# Patient Record
Sex: FEMALE | Race: BLACK OR AFRICAN AMERICAN | Employment: FULL TIME | URBAN - METROPOLITAN AREA
[De-identification: names, ages, dates, MRNs, and addresses within clinical notes are randomized per-mention and may not be internally consistent; named-entity substitution may affect disease eponyms.]

---

## 2017-02-16 ENCOUNTER — PATIENT MESSAGE (OUTPATIENT)
Dept: INTERNAL MEDICINE CLINIC | Facility: CLINIC | Age: 52
End: 2017-02-16

## 2017-02-16 NOTE — TELEPHONE ENCOUNTER
From: Danish Flores  To: Dipika Wilson MD  Sent: 2/16/2017 5:43 AM CST  Subject: Other    Good Morning  I had a Gout flare up last Wednesday that was tolerable when it came to taking meds however I was not able to walk or drive because it was in my righ

## 2017-02-16 NOTE — TELEPHONE ENCOUNTER
Called patient to offer brianne tomorrow - patient already scheduled brianne. With DR. MASSEY for 9 am 2/17

## 2017-02-17 ENCOUNTER — OFFICE VISIT (OUTPATIENT)
Dept: INTERNAL MEDICINE CLINIC | Facility: CLINIC | Age: 52
End: 2017-02-17

## 2017-02-17 ENCOUNTER — LAB ENCOUNTER (OUTPATIENT)
Dept: LAB | Age: 52
End: 2017-02-17
Attending: INTERNAL MEDICINE
Payer: COMMERCIAL

## 2017-02-17 VITALS
HEART RATE: 69 BPM | SYSTOLIC BLOOD PRESSURE: 172 MMHG | OXYGEN SATURATION: 98 % | TEMPERATURE: 98 F | WEIGHT: 255 LBS | DIASTOLIC BLOOD PRESSURE: 110 MMHG | BODY MASS INDEX: 46.93 KG/M2 | HEIGHT: 62 IN

## 2017-02-17 DIAGNOSIS — M1A.9XX0 CHRONIC GOUT WITHOUT TOPHUS, UNSPECIFIED CAUSE, UNSPECIFIED SITE: Primary | ICD-10-CM

## 2017-02-17 DIAGNOSIS — I10 ESSENTIAL HYPERTENSION: ICD-10-CM

## 2017-02-17 DIAGNOSIS — Z12.39 BREAST CANCER SCREENING: ICD-10-CM

## 2017-02-17 DIAGNOSIS — M1A.9XX0 CHRONIC GOUT WITHOUT TOPHUS, UNSPECIFIED CAUSE, UNSPECIFIED SITE: ICD-10-CM

## 2017-02-17 LAB
ALBUMIN SERPL BCP-MCNC: 3.6 G/DL (ref 3.5–4.8)
ALBUMIN/GLOB SERPL: 0.9 {RATIO} (ref 1–2)
ALP SERPL-CCNC: 74 U/L (ref 32–100)
ALT SERPL-CCNC: 22 U/L (ref 14–54)
ANION GAP SERPL CALC-SCNC: 9 MMOL/L (ref 0–18)
AST SERPL-CCNC: 19 U/L (ref 15–41)
BASOPHILS # BLD: 0 K/UL (ref 0–0.2)
BASOPHILS NFR BLD: 1 %
BILIRUB SERPL-MCNC: 0.6 MG/DL (ref 0.3–1.2)
BILIRUB UR QL: NEGATIVE
BUN SERPL-MCNC: 7 MG/DL (ref 8–20)
BUN/CREAT SERPL: 8.3 (ref 10–20)
CALCIUM SERPL-MCNC: 9.4 MG/DL (ref 8.5–10.5)
CHLORIDE SERPL-SCNC: 105 MMOL/L (ref 95–110)
CHOLEST SERPL-MCNC: 202 MG/DL (ref 110–200)
CLARITY UR: CLEAR
CO2 SERPL-SCNC: 29 MMOL/L (ref 22–32)
COLOR UR: YELLOW
CREAT SERPL-MCNC: 0.84 MG/DL (ref 0.5–1.5)
EOSINOPHIL # BLD: 0.1 K/UL (ref 0–0.7)
EOSINOPHIL NFR BLD: 1 %
ERYTHROCYTE [DISTWIDTH] IN BLOOD BY AUTOMATED COUNT: 15.4 % (ref 11–15)
GLOBULIN PLAS-MCNC: 3.9 G/DL (ref 2.5–3.7)
GLUCOSE SERPL-MCNC: 101 MG/DL (ref 70–99)
GLUCOSE UR-MCNC: NEGATIVE MG/DL
HBA1C MFR BLD: 6.5 % (ref 4–6)
HCT VFR BLD AUTO: 41.8 % (ref 35–48)
HDLC SERPL-MCNC: 39 MG/DL
HGB BLD-MCNC: 13.5 G/DL (ref 12–16)
KETONES UR-MCNC: NEGATIVE MG/DL
LDLC SERPL CALC-MCNC: 142 MG/DL (ref 0–99)
LYMPHOCYTES # BLD: 2 K/UL (ref 1–4)
LYMPHOCYTES NFR BLD: 33 %
MCH RBC QN AUTO: 25.9 PG (ref 27–32)
MCHC RBC AUTO-ENTMCNC: 32.3 G/DL (ref 32–37)
MCV RBC AUTO: 80.1 FL (ref 80–100)
MONOCYTES # BLD: 0.5 K/UL (ref 0–1)
MONOCYTES NFR BLD: 9 %
NEUTROPHILS # BLD AUTO: 3.5 K/UL (ref 1.8–7.7)
NEUTROPHILS NFR BLD: 57 %
NITRITE UR QL STRIP.AUTO: NEGATIVE
NONHDLC SERPL-MCNC: 163 MG/DL
OSMOLALITY UR CALC.SUM OF ELEC: 294 MOSM/KG (ref 275–295)
PH UR: 6 [PH] (ref 5–8)
PLATELET # BLD AUTO: 225 K/UL (ref 140–400)
PMV BLD AUTO: 9.6 FL (ref 7.4–10.3)
POTASSIUM SERPL-SCNC: 4.2 MMOL/L (ref 3.3–5.1)
PROT SERPL-MCNC: 7.5 G/DL (ref 5.9–8.4)
PROT UR-MCNC: NEGATIVE MG/DL
RBC # BLD AUTO: 5.21 M/UL (ref 3.7–5.4)
RBC #/AREA URNS AUTO: 1 /HPF
SODIUM SERPL-SCNC: 143 MMOL/L (ref 136–144)
SP GR UR STRIP: 1.01 (ref 1–1.03)
TRIGL SERPL-MCNC: 103 MG/DL (ref 1–149)
TSH SERPL-ACNC: 1.37 UIU/ML (ref 0.34–5.6)
URATE SERPL-MCNC: 7.8 MG/DL (ref 2.1–7.4)
UROBILINOGEN UR STRIP-ACNC: <2
VIT C UR-MCNC: NEGATIVE MG/DL
WBC # BLD AUTO: 6.1 K/UL (ref 4–11)
WBC #/AREA URNS AUTO: 1 /HPF

## 2017-02-17 PROCEDURE — 80053 COMPREHEN METABOLIC PANEL: CPT

## 2017-02-17 PROCEDURE — 99214 OFFICE O/P EST MOD 30 MIN: CPT | Performed by: INTERNAL MEDICINE

## 2017-02-17 PROCEDURE — 99212 OFFICE O/P EST SF 10 MIN: CPT | Performed by: INTERNAL MEDICINE

## 2017-02-17 PROCEDURE — 83036 HEMOGLOBIN GLYCOSYLATED A1C: CPT

## 2017-02-17 PROCEDURE — 85025 COMPLETE CBC W/AUTO DIFF WBC: CPT

## 2017-02-17 PROCEDURE — 84443 ASSAY THYROID STIM HORMONE: CPT

## 2017-02-17 PROCEDURE — 84550 ASSAY OF BLOOD/URIC ACID: CPT

## 2017-02-17 PROCEDURE — 81001 URINALYSIS AUTO W/SCOPE: CPT

## 2017-02-17 PROCEDURE — 36415 COLL VENOUS BLD VENIPUNCTURE: CPT

## 2017-02-17 PROCEDURE — 80061 LIPID PANEL: CPT

## 2017-02-17 RX ORDER — VERAPAMIL HYDROCHLORIDE 180 MG/1
180 CAPSULE, EXTENDED RELEASE ORAL DAILY
Qty: 90 CAPSULE | Refills: 3 | Status: SHIPPED | OUTPATIENT
Start: 2017-02-17 | End: 2017-06-20

## 2017-02-17 RX ORDER — METHYLPREDNISOLONE 4 MG/1
TABLET ORAL
Qty: 1 KIT | Refills: 6 | Status: SHIPPED | OUTPATIENT
Start: 2017-02-17 | End: 2017-03-13

## 2017-02-17 RX ORDER — ENALAPRIL MALEATE 10 MG/1
10 TABLET ORAL DAILY
Qty: 90 TABLET | Refills: 3 | Status: SHIPPED | OUTPATIENT
Start: 2017-02-17 | End: 2017-06-20

## 2017-02-17 RX ORDER — HYDROCODONE BITARTRATE AND ACETAMINOPHEN 5; 325 MG/1; MG/1
1 TABLET ORAL EVERY 8 HOURS PRN
Qty: 30 TABLET | Refills: 0 | Status: SHIPPED | OUTPATIENT
Start: 2017-02-17 | End: 2017-07-05

## 2017-02-17 RX ORDER — ALLOPURINOL 300 MG/1
300 TABLET ORAL DAILY
Qty: 90 TABLET | Refills: 3 | Status: SHIPPED | OUTPATIENT
Start: 2017-02-17 | End: 2018-03-19

## 2017-02-17 NOTE — PROGRESS NOTES
Janneth Velazquez is a 54year old female. HPI:   She is felling well generally but she continues to have repeated attacks of gout  - most likely related to non-compliane with colchicine but she states this makes her dizzy. She is not taking allopurinol.  She kg)  BMI 46.63 kg/m2  SpO2 98%  GENERAL: well developed, well nourished,in no apparent distress  SKIN: no rashes,no suspicious lesions  HEENT: atraumatic, normocephalic,ears and throat are clear  NECK: supple,no adenopathy,no bruits  LUNGS: clear to auscul

## 2017-02-18 ENCOUNTER — HOSPITAL ENCOUNTER (OUTPATIENT)
Dept: GENERAL RADIOLOGY | Facility: HOSPITAL | Age: 52
Discharge: HOME | End: 2017-02-18
Attending: INTERNAL MEDICINE
Payer: COMMERCIAL

## 2017-02-18 DIAGNOSIS — M1A.9XX0 CHRONIC GOUT WITHOUT TOPHUS, UNSPECIFIED CAUSE, UNSPECIFIED SITE: ICD-10-CM

## 2017-02-18 DIAGNOSIS — I10 ESSENTIAL HYPERTENSION: ICD-10-CM

## 2017-02-18 DIAGNOSIS — Z12.39 BREAST CANCER SCREENING: ICD-10-CM

## 2017-02-18 PROCEDURE — 73610 X-RAY EXAM OF ANKLE: CPT

## 2017-02-23 ENCOUNTER — PATIENT MESSAGE (OUTPATIENT)
Dept: INTERNAL MEDICINE CLINIC | Facility: CLINIC | Age: 52
End: 2017-02-23

## 2017-02-24 ENCOUNTER — TELEPHONE (OUTPATIENT)
Dept: INTERNAL MEDICINE CLINIC | Facility: CLINIC | Age: 52
End: 2017-02-24

## 2017-02-24 DIAGNOSIS — M10.00 IDIOPATHIC GOUT, UNSPECIFIED CHRONICITY, UNSPECIFIED SITE: Primary | ICD-10-CM

## 2017-02-24 NOTE — TELEPHONE ENCOUNTER
Called patient with Dr Vale Galaviz message. She was not taking the allopurinol daily. She will resume and repeat uric acid level in 2 months. I will enter lab order.

## 2017-02-24 NOTE — TELEPHONE ENCOUNTER
From: Haleigh Flores  To: Andrea Maldonado MD  Sent: 2/23/2017 1:32 PM CST  Subject: Other    I don't have a copy of it  Again it was faxed from [facility]    on the system I am not able to reply to previous notes

## 2017-02-24 NOTE — TELEPHONE ENCOUNTER
labs good including lipid;  A1C 6.5 which indicates mild diabetes    Her uric acid is at high range of normal and will lead to repeated episodes of gout. Is she taking allopurinol 300 mg daily? ?  Let me know.  If not resume taking  And repeat uric acid in

## 2017-03-10 ENCOUNTER — HOSPITAL ENCOUNTER (OUTPATIENT)
Dept: MAMMOGRAPHY | Facility: HOSPITAL | Age: 52
Discharge: HOME | End: 2017-03-10
Attending: INTERNAL MEDICINE
Payer: COMMERCIAL

## 2017-03-10 DIAGNOSIS — Z12.39 BREAST CANCER SCREENING: ICD-10-CM

## 2017-03-10 PROCEDURE — 77067 SCR MAMMO BI INCL CAD: CPT

## 2017-03-13 ENCOUNTER — OFFICE VISIT (OUTPATIENT)
Dept: RHEUMATOLOGY | Facility: CLINIC | Age: 52
End: 2017-03-13

## 2017-03-13 VITALS
HEIGHT: 62 IN | HEART RATE: 70 BPM | BODY MASS INDEX: 47.29 KG/M2 | DIASTOLIC BLOOD PRESSURE: 110 MMHG | WEIGHT: 257 LBS | SYSTOLIC BLOOD PRESSURE: 168 MMHG

## 2017-03-13 DIAGNOSIS — M1A.9XX0 CHRONIC GOUT WITHOUT TOPHUS, UNSPECIFIED CAUSE, UNSPECIFIED SITE: Primary | ICD-10-CM

## 2017-03-13 PROCEDURE — 99212 OFFICE O/P EST SF 10 MIN: CPT | Performed by: INTERNAL MEDICINE

## 2017-03-13 PROCEDURE — 99244 OFF/OP CNSLTJ NEW/EST MOD 40: CPT | Performed by: INTERNAL MEDICINE

## 2017-03-13 NOTE — PROGRESS NOTES
Dear Maxwell Warren:    I saw your patient Rut Alcantar in consultation this morning at your request for evaluation of gout. As you know, she is a delightful 46-year-old woman who had her first attack in a knee in 2002.   Fluid was aspirated in an emergency room, an exercising. Review of systems:  No fevers or chills. She has hot flashes. No anorexia. She is purposely lost weight. No rash. Some stable hair thinning for 20 years. No history of internal eye inflammation, oral or nasal ulcers, lymphadenopathy. Chio DALAL   Rheumatology.

## 2017-06-12 ENCOUNTER — TELEPHONE (OUTPATIENT)
Dept: INTERNAL MEDICINE CLINIC | Facility: CLINIC | Age: 52
End: 2017-06-12

## 2017-06-12 ENCOUNTER — PATIENT MESSAGE (OUTPATIENT)
Dept: INTERNAL MEDICINE CLINIC | Facility: CLINIC | Age: 52
End: 2017-06-12

## 2017-06-12 DIAGNOSIS — Z00.00 ANNUAL PHYSICAL EXAM: Primary | ICD-10-CM

## 2017-06-12 NOTE — TELEPHONE ENCOUNTER
From: Araceli Flores  To: Kim Azul MD  Sent: 6/12/2017 12:05 PM CDT  Subject: Other    Hi  I have not been feeling well. I would like to go and get some bloodwork that Dr Rondell Ganser wanted me to get a few months back.   I would love to have that done this

## 2017-06-12 NOTE — TELEPHONE ENCOUNTER
Please order CBC, CMP, lipids, TSH, uric acid, A1C, urine microalbumin, u/a c micro    See me next week

## 2017-06-15 ENCOUNTER — HOSPITAL ENCOUNTER (EMERGENCY)
Facility: HOSPITAL | Age: 52
Discharge: HOME | End: 2017-06-16
Attending: EMERGENCY MEDICINE

## 2017-06-15 DIAGNOSIS — N39.0 URINARY TRACT INFECTION WITHOUT HEMATURIA, SITE UNSPECIFIED: Primary | ICD-10-CM

## 2017-06-15 PROCEDURE — 87086 URINE CULTURE/COLONY COUNT: CPT

## 2017-06-15 PROCEDURE — 36415 COLL VENOUS BLD VENIPUNCTURE: CPT

## 2017-06-15 PROCEDURE — 99285 EMERGENCY DEPT VISIT HI MDM: CPT

## 2017-06-15 PROCEDURE — 81001 URINALYSIS AUTO W/SCOPE: CPT

## 2017-06-15 PROCEDURE — 96365 THER/PROPH/DIAG IV INF INIT: CPT

## 2017-06-15 RX ORDER — IBUPROFEN 600 MG/1
600 TABLET ORAL ONCE
Status: DISCONTINUED | OUTPATIENT
Start: 2017-06-15 | End: 2017-06-15

## 2017-06-16 ENCOUNTER — TELEPHONE (OUTPATIENT)
Dept: INTERNAL MEDICINE CLINIC | Facility: CLINIC | Age: 52
End: 2017-06-16

## 2017-06-16 ENCOUNTER — APPOINTMENT (OUTPATIENT)
Dept: CT IMAGING | Facility: HOSPITAL | Age: 52
End: 2017-06-16
Attending: EMERGENCY MEDICINE

## 2017-06-16 VITALS
HEART RATE: 66 BPM | RESPIRATION RATE: 18 BRPM | HEIGHT: 62 IN | SYSTOLIC BLOOD PRESSURE: 139 MMHG | OXYGEN SATURATION: 95 % | BODY MASS INDEX: 44.9 KG/M2 | TEMPERATURE: 99 F | WEIGHT: 244 LBS | DIASTOLIC BLOOD PRESSURE: 74 MMHG

## 2017-06-16 PROCEDURE — 87040 BLOOD CULTURE FOR BACTERIA: CPT | Performed by: EMERGENCY MEDICINE

## 2017-06-16 PROCEDURE — 80048 BASIC METABOLIC PNL TOTAL CA: CPT | Performed by: EMERGENCY MEDICINE

## 2017-06-16 PROCEDURE — 74176 CT ABD & PELVIS W/O CONTRAST: CPT | Performed by: EMERGENCY MEDICINE

## 2017-06-16 PROCEDURE — 85025 COMPLETE CBC W/AUTO DIFF WBC: CPT | Performed by: EMERGENCY MEDICINE

## 2017-06-16 RX ORDER — SULFAMETHOXAZOLE AND TRIMETHOPRIM 200; 40 MG/5ML; MG/5ML
20 SUSPENSION ORAL 2 TIMES DAILY
Qty: 400 ML | Refills: 0 | Status: SHIPPED | OUTPATIENT
Start: 2017-06-16 | End: 2017-06-26

## 2017-06-16 NOTE — TELEPHONE ENCOUNTER
Patient contacted, notified that not all the labs that [facility] ordered per 6/12/17 encounter were drawn in the hospital, she still needs to get fasting labs done.  Patient states she is on antibiotics, UA and urine culture already done at the hospital, pat

## 2017-06-16 NOTE — TELEPHONE ENCOUNTER
Pt. Has an appt. Tuesday Pt. Was discharged form [facility] today she wants to know if the blood they took is sufficient enough or if she should still get the labs done that Dr. Jordi Shelby ordered since she was supposed to fast for 14 hours.  Pt. Is also mateus

## 2017-06-17 ENCOUNTER — LAB ENCOUNTER (OUTPATIENT)
Dept: LAB | Facility: HOSPITAL | Age: 52
End: 2017-06-17
Attending: INTERNAL MEDICINE

## 2017-06-17 DIAGNOSIS — Z00.00 ANNUAL PHYSICAL EXAM: ICD-10-CM

## 2017-06-17 PROCEDURE — 84550 ASSAY OF BLOOD/URIC ACID: CPT

## 2017-06-17 PROCEDURE — 82570 ASSAY OF URINE CREATININE: CPT

## 2017-06-17 PROCEDURE — 80053 COMPREHEN METABOLIC PANEL: CPT

## 2017-06-17 PROCEDURE — 80061 LIPID PANEL: CPT

## 2017-06-17 PROCEDURE — 83036 HEMOGLOBIN GLYCOSYLATED A1C: CPT

## 2017-06-17 PROCEDURE — 82043 UR ALBUMIN QUANTITATIVE: CPT

## 2017-06-17 PROCEDURE — 84443 ASSAY THYROID STIM HORMONE: CPT

## 2017-06-17 PROCEDURE — 36415 COLL VENOUS BLD VENIPUNCTURE: CPT

## 2017-06-18 NOTE — ED PROVIDER NOTES
Patient Seen in: [facility] Emergency Department    History   Patient presents with:  Fever (infectious)    Stated Complaint: 103 fever no meds taken for fever weakness and HTN    HPI    46-year-old female presents to the emergency department with taken for fever weakness and HTN  Other systems are as noted in HPI. Constitutional and vital signs reviewed. All other systems reviewed and negative except as noted above.     PSFH elements reviewed from today and agreed except as otherwise stated in Bacteria Urine Few (*)     All other components within normal limits   BASIC METABOLIC PANEL (8) - Abnormal; Notable for the following:     Glucose 120 (*)     BUN/CREA Ratio 8.2 (*)     All other components within normal limits   CBC W/ DIFFERENTIAL -

## 2017-06-20 ENCOUNTER — OFFICE VISIT (OUTPATIENT)
Dept: INTERNAL MEDICINE CLINIC | Facility: CLINIC | Age: 52
End: 2017-06-20

## 2017-06-20 VITALS
DIASTOLIC BLOOD PRESSURE: 88 MMHG | BODY MASS INDEX: 45.08 KG/M2 | WEIGHT: 245 LBS | OXYGEN SATURATION: 98 % | HEIGHT: 62 IN | TEMPERATURE: 98 F | SYSTOLIC BLOOD PRESSURE: 158 MMHG | HEART RATE: 72 BPM

## 2017-06-20 DIAGNOSIS — N30.01 ACUTE CYSTITIS WITH HEMATURIA: Primary | ICD-10-CM

## 2017-06-20 DIAGNOSIS — M1A.9XX0 CHRONIC GOUT WITHOUT TOPHUS, UNSPECIFIED CAUSE, UNSPECIFIED SITE: ICD-10-CM

## 2017-06-20 DIAGNOSIS — I10 ESSENTIAL HYPERTENSION: ICD-10-CM

## 2017-06-20 PROBLEM — N39.0 UTI (URINARY TRACT INFECTION): Status: ACTIVE | Noted: 2017-06-20

## 2017-06-20 PROCEDURE — 99212 OFFICE O/P EST SF 10 MIN: CPT | Performed by: INTERNAL MEDICINE

## 2017-06-20 PROCEDURE — 99214 OFFICE O/P EST MOD 30 MIN: CPT | Performed by: INTERNAL MEDICINE

## 2017-06-20 RX ORDER — VERAPAMIL HYDROCHLORIDE 180 MG/1
180 CAPSULE, EXTENDED RELEASE ORAL DAILY
Qty: 90 CAPSULE | Refills: 3 | Status: SHIPPED | OUTPATIENT
Start: 2017-06-20 | End: 2018-03-23

## 2017-06-20 RX ORDER — ENALAPRIL MALEATE 10 MG/1
10 TABLET ORAL DAILY
Qty: 90 TABLET | Refills: 3 | Status: SHIPPED | OUTPATIENT
Start: 2017-06-20 | End: 2018-03-19

## 2017-06-20 NOTE — PROGRESS NOTES
Carlos Arenas is a 50year old female. HPI:   1. Acute cystitis with hematuria  - she developed a pulling sensation after voiding on 6/4- she went ot ED on 6/15 with fever to 103 and shills and U/A and culture neg - which was ultimately negative.  2 blood exertion  CARDIOVASCULAR: denies chest pain on exertion  GI: denies abdominal pain and denies heartburn  NEURO: denies headaches    EXAM:   /88 mmHg  Pulse 72  Temp(Src) 98.4 °F (36.9 °C) (Oral)  Ht 5' 2\" (1.575 m)  Wt 245 lb (111.131 kg)  BMI 44.80

## 2017-06-30 ENCOUNTER — TELEPHONE (OUTPATIENT)
Dept: INTERNAL MEDICINE CLINIC | Facility: CLINIC | Age: 52
End: 2017-06-30

## 2017-07-05 ENCOUNTER — TELEPHONE (OUTPATIENT)
Dept: INTERNAL MEDICINE CLINIC | Facility: CLINIC | Age: 52
End: 2017-07-05

## 2017-07-05 RX ORDER — HYDROCODONE BITARTRATE AND ACETAMINOPHEN 5; 325 MG/1; MG/1
1 TABLET ORAL EVERY 8 HOURS PRN
Qty: 30 TABLET | Refills: 0 | Status: SHIPPED | OUTPATIENT
Start: 2017-07-05 | End: 2017-07-12

## 2017-07-05 RX ORDER — METHYLPREDNISOLONE 4 MG/1
TABLET ORAL
Qty: 1 KIT | Refills: 1 | Status: SHIPPED | OUTPATIENT
Start: 2017-07-05 | End: 2018-04-27

## 2017-07-05 NOTE — TELEPHONE ENCOUNTER
Acute gout - gve Medrol and Norco    [facility] given to [address] in Medrol dosepak to whatever pharmacy she is now using and 1 refill

## 2017-07-05 NOTE — TELEPHONE ENCOUNTER
Patient called and informed Medrol and Naveen Julio was ordered for her. Patient verified pharmacy as [facility] in [place] where Medrol was sent to. Patient informed Naveen Julio script needs to be picked up at office location.  Patient verbalized understanding with no fu

## 2017-07-05 NOTE — TELEPHONE ENCOUNTER
Patient states she got 's message regarding the gout and she is not doing well, originally she was having problems with left ankle and now gout is in her right big toe (since Saturday but symptoms \"fully kicked in Monday night).  Patient wanted

## 2017-07-11 ENCOUNTER — TELEPHONE (OUTPATIENT)
Dept: INTERNAL MEDICINE CLINIC | Facility: CLINIC | Age: 52
End: 2017-07-11

## 2017-07-11 NOTE — TELEPHONE ENCOUNTER
Regarding: Other  Contact: [phone]  ----- Message from Yousuf Franco RN sent at 7/10/2017  9:08 AM CDT -----       ----- Message from [address] [place] [address] to Marie Ellison MD sent at 7/10/2017  8:55 AM -----   Hi Dr Deena Tyler  I have a new turn of ev

## 2017-07-12 ENCOUNTER — OFFICE VISIT (OUTPATIENT)
Dept: INTERNAL MEDICINE CLINIC | Facility: CLINIC | Age: 52
End: 2017-07-12

## 2017-07-12 VITALS
TEMPERATURE: 99 F | SYSTOLIC BLOOD PRESSURE: 160 MMHG | BODY MASS INDEX: 46 KG/M2 | HEART RATE: 68 BPM | DIASTOLIC BLOOD PRESSURE: 110 MMHG | WEIGHT: 251 LBS

## 2017-07-12 DIAGNOSIS — M1A.9XX0 CHRONIC GOUT WITHOUT TOPHUS, UNSPECIFIED CAUSE, UNSPECIFIED SITE: Primary | ICD-10-CM

## 2017-07-12 DIAGNOSIS — I10 ESSENTIAL HYPERTENSION: ICD-10-CM

## 2017-07-12 RX ORDER — HYDROCODONE BITARTRATE AND ACETAMINOPHEN 5; 325 MG/1; MG/1
1 TABLET ORAL EVERY 8 HOURS PRN
Qty: 30 TABLET | Refills: 0 | Status: SHIPPED | OUTPATIENT
Start: 2017-07-12

## 2017-07-13 NOTE — PROGRESS NOTES
Vandana Masters is a 50year old female. HPI:   1.  Chronic gout without tophus, unspecified cause, unspecified site - she has had severe and recurrent gout which started on 7/1 - she had pain in the left ankle and then pain in the right great toe and right Never Smoker                                                              Smokeless tobacco: Never Used                      Alcohol use:  No                 REVIEW OF SYSTEMS:   GENERAL HEALTH: feels well otherwise  SKIN: denies any unusual skin lesions or

## 2017-07-17 ENCOUNTER — PATIENT MESSAGE (OUTPATIENT)
Dept: INTERNAL MEDICINE CLINIC | Facility: CLINIC | Age: 52
End: 2017-07-17

## 2017-07-17 ENCOUNTER — TELEPHONE (OUTPATIENT)
Dept: INTERNAL MEDICINE CLINIC | Facility: CLINIC | Age: 52
End: 2017-07-17

## 2017-07-17 NOTE — TELEPHONE ENCOUNTER
Ray Edmonds   After my appointment on Wednesday, I felt a great relief from Gout on Thursday from both joints- my Right Toe and my right Ankle. I was at a pain level of 3 on a scale of 1-10. With that being said- I DID NOT START THE STEROID PACK.

## 2017-08-07 ENCOUNTER — TELEPHONE (OUTPATIENT)
Dept: INTERNAL MEDICINE CLINIC | Facility: CLINIC | Age: 52
End: 2017-08-07

## 2017-08-07 ENCOUNTER — PATIENT MESSAGE (OUTPATIENT)
Dept: INTERNAL MEDICINE CLINIC | Facility: CLINIC | Age: 52
End: 2017-08-07

## 2017-08-07 RX ORDER — METHYLPREDNISOLONE 4 MG/1
TABLET ORAL
Qty: 1 KIT | Refills: 0 | Status: SHIPPED | OUTPATIENT
Start: 2017-08-07 | End: 2018-03-19

## 2017-08-07 NOTE — TELEPHONE ENCOUNTER
From: Ruth Flores  To: Britt Wallace MD  Sent: 8/7/2017 3:36 PM CDT  Subject: Other    I called around 8am this morning and spoke to Myra Montez if I could have Dr Zahra Richter could call   I have active gout in Right big toe  Taking Allopurinol faithfully

## 2017-08-07 NOTE — TELEPHONE ENCOUNTER
Patient having a gout attack - started today - right toe. Patient was instructed to advise Dr. Shiva Kearns when this happens.

## 2017-08-07 NOTE — TELEPHONE ENCOUNTER
8/7/17 3:36 PM   I called around 8am this morning and spoke to Keerthi Reddy if I could have Dr Anup Edmonds could call   I have active gout in Right big toe   Taking Allopurinol faithfully   Have not eaten any trigger foods that I am familiar with   Please con

## 2017-08-21 ENCOUNTER — TELEPHONE (OUTPATIENT)
Dept: INTERNAL MEDICINE CLINIC | Facility: CLINIC | Age: 52
End: 2017-08-21

## 2017-08-21 NOTE — TELEPHONE ENCOUNTER
Please notify patient that I am covering for Dr. Harriett Be. I reviewed her email to him from August 17, 2017. I appreciate her letting us know she started the Medrol Dosepak. Please ask her how she is doing today and where her gout attack is.

## 2017-08-21 NOTE — TELEPHONE ENCOUNTER
As FYI to DR. AUGUST called patient who states the gout is in left ankle, it is  her second Medrol Dose pack and she is also still on Allopurinol, she states finally getting better

## 2018-03-15 ENCOUNTER — APPOINTMENT (OUTPATIENT)
Dept: GENERAL RADIOLOGY | Facility: HOSPITAL | Age: 53
End: 2018-03-15
Payer: COMMERCIAL

## 2018-03-15 ENCOUNTER — TELEPHONE (OUTPATIENT)
Dept: INTERNAL MEDICINE CLINIC | Facility: CLINIC | Age: 53
End: 2018-03-15

## 2018-03-15 ENCOUNTER — HOSPITAL ENCOUNTER (EMERGENCY)
Facility: HOSPITAL | Age: 53
Discharge: HOME | End: 2018-03-15
Payer: COMMERCIAL

## 2018-03-15 VITALS
SYSTOLIC BLOOD PRESSURE: 193 MMHG | TEMPERATURE: 98 F | HEART RATE: 76 BPM | RESPIRATION RATE: 18 BRPM | BODY MASS INDEX: 47 KG/M2 | OXYGEN SATURATION: 100 % | WEIGHT: 255 LBS | DIASTOLIC BLOOD PRESSURE: 103 MMHG

## 2018-03-15 DIAGNOSIS — I10 HYPERTENSION, UNCONTROLLED: Primary | ICD-10-CM

## 2018-03-15 LAB
ANION GAP SERPL CALC-SCNC: 9 MMOL/L (ref 0–18)
BASOPHILS # BLD: 0.1 K/UL (ref 0–0.2)
BASOPHILS NFR BLD: 1 %
BUN SERPL-MCNC: 10 MG/DL (ref 8–20)
BUN/CREAT SERPL: 13.9 (ref 10–20)
CALCIUM SERPL-MCNC: 9.7 MG/DL (ref 8.5–10.5)
CHLORIDE SERPL-SCNC: 104 MMOL/L (ref 95–110)
CO2 SERPL-SCNC: 27 MMOL/L (ref 22–32)
CREAT SERPL-MCNC: 0.72 MG/DL (ref 0.5–1.5)
EOSINOPHIL # BLD: 0.1 K/UL (ref 0–0.7)
EOSINOPHIL NFR BLD: 1 %
ERYTHROCYTE [DISTWIDTH] IN BLOOD BY AUTOMATED COUNT: 15.6 % (ref 11–15)
GLUCOSE SERPL-MCNC: 95 MG/DL (ref 70–99)
HCT VFR BLD AUTO: 41.9 % (ref 35–48)
HGB BLD-MCNC: 13.8 G/DL (ref 12–16)
LYMPHOCYTES # BLD: 2.3 K/UL (ref 1–4)
LYMPHOCYTES NFR BLD: 33 %
MCH RBC QN AUTO: 25.7 PG (ref 27–32)
MCHC RBC AUTO-ENTMCNC: 32.9 G/DL (ref 32–37)
MCV RBC AUTO: 78.2 FL (ref 80–100)
MONOCYTES # BLD: 0.6 K/UL (ref 0–1)
MONOCYTES NFR BLD: 8 %
NEUTROPHILS # BLD AUTO: 4.1 K/UL (ref 1.8–7.7)
NEUTROPHILS NFR BLD: 58 %
OSMOLALITY UR CALC.SUM OF ELEC: 289 MOSM/KG (ref 275–295)
PLATELET # BLD AUTO: 290 K/UL (ref 140–400)
PMV BLD AUTO: 9.1 FL (ref 7.4–10.3)
POTASSIUM SERPL-SCNC: 3.7 MMOL/L (ref 3.3–5.1)
RBC # BLD AUTO: 5.35 M/UL (ref 3.7–5.4)
SODIUM SERPL-SCNC: 140 MMOL/L (ref 136–144)
TROPONIN I SERPL-MCNC: 0 NG/ML (ref ?–0.03)
WBC # BLD AUTO: 7.1 K/UL (ref 4–11)

## 2018-03-15 PROCEDURE — 80048 BASIC METABOLIC PNL TOTAL CA: CPT

## 2018-03-15 PROCEDURE — 71046 X-RAY EXAM CHEST 2 VIEWS: CPT

## 2018-03-15 PROCEDURE — 36415 COLL VENOUS BLD VENIPUNCTURE: CPT

## 2018-03-15 PROCEDURE — 93005 ELECTROCARDIOGRAM TRACING: CPT

## 2018-03-15 PROCEDURE — 84484 ASSAY OF TROPONIN QUANT: CPT

## 2018-03-15 PROCEDURE — 93010 ELECTROCARDIOGRAM REPORT: CPT | Performed by: INTERNAL MEDICINE

## 2018-03-15 PROCEDURE — 99285 EMERGENCY DEPT VISIT HI MDM: CPT

## 2018-03-15 PROCEDURE — 85025 COMPLETE CBC W/AUTO DIFF WBC: CPT

## 2018-03-15 RX ORDER — ENALAPRIL MALEATE 10 MG/1
10 TABLET ORAL DAILY
Qty: 30 TABLET | Refills: 0 | Status: SHIPPED | OUTPATIENT
Start: 2018-03-15 | End: 2018-03-23

## 2018-03-15 RX ORDER — ENALAPRIL MALEATE 10 MG/1
10 TABLET ORAL DAILY
Qty: 90 TABLET | Refills: 3 | Status: CANCELLED | OUTPATIENT
Start: 2018-03-15

## 2018-03-15 NOTE — TELEPHONE ENCOUNTER
Please advise  For DR. MASSEY  patient- called patient who was put on Enalapril 10 mg June 2016 - patient never picked up RX . Her BP)is 200/122. She woul dlike new RX sent - already scheduled brianne with DR. MASSEY for Tuesday . Pharmacy updated- to DR. Kyree Chapman

## 2018-03-15 NOTE — ED NOTES
Pt here for chest pain. Pt states she has high blood pressure, takes Verapimil and has been monitoring her blood pressure at home because it has been high.  Pt states she was prescribed a secondary BP medication for when her BP became very high, but states

## 2018-03-15 NOTE — TELEPHONE ENCOUNTER
Scheduled appt to see Dr Shyanne Valero on Monday 3/19 for elevated blood pressure  Would like to speak with a nurse re: medication

## 2018-03-16 NOTE — ED PROVIDER NOTES
Patient Seen in: [facility] Emergency Department    History   Patient presents with:  Chest Pain Angina (cardiovascular)      HPI    Patient presents to the ED complaining of elevated blood pressure for the past several weeks.   She states that she negative. Constitutional and vital signs reviewed. Social History and Family History elements reviewed from today, pertinent positives to the presenting problem noted.     Physical Exam     ED Triage Vitals  BP: (!) 191/102 [03/15/18 1553]  Pulse: 7 DIFFERENTIAL[[id number]]          Abnormal            Final result                 Please view results for these tests on the individual orders.    RAINBOW DRAW BLUE   RAINBOW DRAW LAVENDER   RAINBOW DRAW DARK GREEN   RAINBOW DRAW LIGHT GREEN   RAINBOW DRAW if needed. Patient comfortable going home at this time, will return if worse. PMD follow-up. Additional verbal instructions were given and discussed with the patient and/or caregiver.     Condition upon leaving the department: Stable    Disposition and

## 2018-03-19 ENCOUNTER — OFFICE VISIT (OUTPATIENT)
Dept: INTERNAL MEDICINE CLINIC | Facility: CLINIC | Age: 53
End: 2018-03-19

## 2018-03-19 VITALS
TEMPERATURE: 99 F | HEART RATE: 74 BPM | HEIGHT: 62 IN | DIASTOLIC BLOOD PRESSURE: 118 MMHG | WEIGHT: 256 LBS | OXYGEN SATURATION: 98 % | BODY MASS INDEX: 47.11 KG/M2 | SYSTOLIC BLOOD PRESSURE: 190 MMHG

## 2018-03-19 DIAGNOSIS — M1A.9XX0 CHRONIC GOUT WITHOUT TOPHUS, UNSPECIFIED CAUSE, UNSPECIFIED SITE: ICD-10-CM

## 2018-03-19 DIAGNOSIS — E78.00 HYPERCHOLESTEREMIA: ICD-10-CM

## 2018-03-19 DIAGNOSIS — Z88.8 ALLERGY TO ACE INHIBITORS: ICD-10-CM

## 2018-03-19 DIAGNOSIS — Z00.00 ANNUAL PHYSICAL EXAM: ICD-10-CM

## 2018-03-19 DIAGNOSIS — I10 ESSENTIAL HYPERTENSION: Primary | ICD-10-CM

## 2018-03-19 DIAGNOSIS — E13.9 DIABETES 1.5, MANAGED AS TYPE 2 (HCC): ICD-10-CM

## 2018-03-19 PROCEDURE — 99396 PREV VISIT EST AGE 40-64: CPT | Performed by: INTERNAL MEDICINE

## 2018-03-19 RX ORDER — AMLODIPINE BESYLATE 5 MG/1
5 TABLET ORAL DAILY
Qty: 90 TABLET | Refills: 3 | Status: SHIPPED | OUTPATIENT
Start: 2018-03-19 | End: 2018-03-23

## 2018-03-19 RX ORDER — ALLOPURINOL 300 MG/1
300 TABLET ORAL DAILY
Qty: 90 TABLET | Refills: 3 | Status: SHIPPED | OUTPATIENT
Start: 2018-03-19 | End: 2020-12-23

## 2018-03-19 RX ORDER — LABETALOL 100 MG/1
100 TABLET, FILM COATED ORAL 2 TIMES DAILY
Qty: 180 TABLET | Refills: 3 | Status: SHIPPED | OUTPATIENT
Start: 2018-03-19 | End: 2019-08-05

## 2018-03-19 NOTE — PROGRESS NOTES
Pam Soto is a 50year old female. HPI:   She is here for annual exam    She has been feeling well and has been living between here and [place]. Recently she noted her BP to be out of control. Otherwise she has no physical complaints.    Good energy good ap Take 1 capsule (50 mg total) by mouth every 6 (six) hours as needed (gout). Disp: 30 capsule Rfl: 3   Enalapril Maleate 10 MG Oral Tab Take 1 tablet (10 mg total) by mouth daily.  Disp: 30 tablet Rfl: 0      Past Medical History:   Diagnosis Date   • Gout of these issues and agrees to the plan.   The patient is asked to return in 1 week

## 2018-03-20 ENCOUNTER — TELEPHONE (OUTPATIENT)
Dept: INTERNAL MEDICINE CLINIC | Facility: CLINIC | Age: 53
End: 2018-03-20

## 2018-03-20 PROBLEM — Z00.00 ANNUAL PHYSICAL EXAM: Status: ACTIVE | Noted: 2018-03-20

## 2018-03-20 PROBLEM — E13.9 DIABETES 1.5, MANAGED AS TYPE 2 (HCC): Status: ACTIVE | Noted: 2018-03-20

## 2018-03-20 NOTE — TELEPHONE ENCOUNTER
Spoke with pt relayed MD message to pt. Pt is scheduled to come in Friday, she will have labs done before she comes in.

## 2018-03-21 ENCOUNTER — APPOINTMENT (OUTPATIENT)
Dept: LAB | Facility: HOSPITAL | Age: 53
End: 2018-03-21
Attending: INTERNAL MEDICINE
Payer: COMMERCIAL

## 2018-03-21 DIAGNOSIS — E78.00 HYPERCHOLESTEREMIA: ICD-10-CM

## 2018-03-21 LAB
ALBUMIN SERPL BCP-MCNC: 3.9 G/DL (ref 3.5–4.8)
ALBUMIN/GLOB SERPL: 1 {RATIO} (ref 1–2)
ALP SERPL-CCNC: 63 U/L (ref 32–100)
ALT SERPL-CCNC: 24 U/L (ref 14–54)
ANION GAP SERPL CALC-SCNC: 9 MMOL/L (ref 0–18)
AST SERPL-CCNC: 24 U/L (ref 15–41)
BILIRUB SERPL-MCNC: 0.7 MG/DL (ref 0.3–1.2)
BILIRUB UR QL: NEGATIVE
BUN SERPL-MCNC: 5 MG/DL (ref 8–20)
BUN/CREAT SERPL: 6 (ref 10–20)
CALCIUM SERPL-MCNC: 9.4 MG/DL (ref 8.5–10.5)
CHLORIDE SERPL-SCNC: 104 MMOL/L (ref 95–110)
CHOLEST SERPL-MCNC: 229 MG/DL (ref 110–200)
CO2 SERPL-SCNC: 27 MMOL/L (ref 22–32)
COLOR UR: YELLOW
CREAT SERPL-MCNC: 0.83 MG/DL (ref 0.5–1.5)
CREAT UR-MCNC: 142 MG/DL
GLOBULIN PLAS-MCNC: 4.1 G/DL (ref 2.5–3.7)
GLUCOSE SERPL-MCNC: 96 MG/DL (ref 70–99)
GLUCOSE UR-MCNC: NEGATIVE MG/DL
HDLC SERPL-MCNC: 45 MG/DL
KETONES UR-MCNC: NEGATIVE MG/DL
LDLC SERPL CALC-MCNC: 163 MG/DL (ref 0–99)
MICROALBUMIN UR-MCNC: 2.2 MG/DL (ref 0–1.8)
MICROALBUMIN/CREAT UR: 15.5 MG/G{CREAT} (ref 0–20)
NITRITE UR QL STRIP.AUTO: NEGATIVE
NONHDLC SERPL-MCNC: 184 MG/DL
OSMOLALITY UR CALC.SUM OF ELEC: 287 MOSM/KG (ref 275–295)
PATIENT FASTING: YES
PH UR: 6 [PH] (ref 5–8)
POTASSIUM SERPL-SCNC: 3.8 MMOL/L (ref 3.3–5.1)
PROT SERPL-MCNC: 8 G/DL (ref 5.9–8.4)
PROT UR-MCNC: NEGATIVE MG/DL
RBC #/AREA URNS AUTO: 3 /HPF
SODIUM SERPL-SCNC: 140 MMOL/L (ref 136–144)
SP GR UR STRIP: 1.01 (ref 1–1.03)
TRIGL SERPL-MCNC: 104 MG/DL (ref 1–149)
TSH SERPL-ACNC: 3.15 UIU/ML (ref 0.45–5.33)
UROBILINOGEN UR STRIP-ACNC: <2
VIT C UR-MCNC: NEGATIVE MG/DL
WBC #/AREA URNS AUTO: 11 /HPF

## 2018-03-21 PROCEDURE — 36415 COLL VENOUS BLD VENIPUNCTURE: CPT

## 2018-03-21 PROCEDURE — 81001 URINALYSIS AUTO W/SCOPE: CPT | Performed by: INTERNAL MEDICINE

## 2018-03-21 PROCEDURE — 80053 COMPREHEN METABOLIC PANEL: CPT

## 2018-03-21 PROCEDURE — 87086 URINE CULTURE/COLONY COUNT: CPT | Performed by: INTERNAL MEDICINE

## 2018-03-21 PROCEDURE — 87186 SC STD MICRODIL/AGAR DIL: CPT | Performed by: INTERNAL MEDICINE

## 2018-03-21 PROCEDURE — 80061 LIPID PANEL: CPT

## 2018-03-21 PROCEDURE — 82570 ASSAY OF URINE CREATININE: CPT

## 2018-03-21 PROCEDURE — 87077 CULTURE AEROBIC IDENTIFY: CPT | Performed by: INTERNAL MEDICINE

## 2018-03-21 PROCEDURE — 84443 ASSAY THYROID STIM HORMONE: CPT

## 2018-03-21 PROCEDURE — 82043 UR ALBUMIN QUANTITATIVE: CPT

## 2018-03-23 ENCOUNTER — OFFICE VISIT (OUTPATIENT)
Dept: INTERNAL MEDICINE CLINIC | Facility: CLINIC | Age: 53
End: 2018-03-23

## 2018-03-23 VITALS
WEIGHT: 256 LBS | HEIGHT: 62 IN | SYSTOLIC BLOOD PRESSURE: 154 MMHG | TEMPERATURE: 98 F | DIASTOLIC BLOOD PRESSURE: 96 MMHG | OXYGEN SATURATION: 98 % | HEART RATE: 67 BPM | BODY MASS INDEX: 47.11 KG/M2

## 2018-03-23 DIAGNOSIS — E13.9 DIABETES 1.5, MANAGED AS TYPE 2 (HCC): ICD-10-CM

## 2018-03-23 DIAGNOSIS — I10 ESSENTIAL HYPERTENSION: Primary | ICD-10-CM

## 2018-03-23 DIAGNOSIS — Z12.39 BREAST CANCER SCREENING: ICD-10-CM

## 2018-03-23 DIAGNOSIS — E78.00 HYPERCHOLESTEREMIA: ICD-10-CM

## 2018-03-23 PROCEDURE — 99213 OFFICE O/P EST LOW 20 MIN: CPT | Performed by: INTERNAL MEDICINE

## 2018-03-23 PROCEDURE — 99212 OFFICE O/P EST SF 10 MIN: CPT | Performed by: INTERNAL MEDICINE

## 2018-03-23 NOTE — PROGRESS NOTES
Martha Salinas is a 50year old female. HPI:   1. Essential hypertension  Her BPis improved but she has developed nausea (mild) and she thinks its due to one of the new drugs: amlodipine or labetalol. Her ave BP now is about 150/90.  She is other wise feeli exertion  GI: denies abdominal pain and denies heartburn  NEURO: denies headaches    EXAM:   /96 (BP Location: Left arm, Patient Position: Sitting, Cuff Size: large)   Pulse 67   Temp 97.7 °F (36.5 °C) (Oral)   Ht 5' 2\" (1.575 m)   Wt 256 lb (116.1

## 2018-03-26 ENCOUNTER — TELEPHONE (OUTPATIENT)
Dept: INTERNAL MEDICINE CLINIC | Facility: CLINIC | Age: 53
End: 2018-03-26

## 2018-03-26 DIAGNOSIS — N39.0 URINARY TRACT INFECTION WITHOUT HEMATURIA, SITE UNSPECIFIED: Primary | ICD-10-CM

## 2018-03-26 RX ORDER — CIPROFLOXACIN 250 MG/1
250 TABLET, FILM COATED ORAL 2 TIMES DAILY
Qty: 10 TABLET | Refills: 0 | Status: SHIPPED | OUTPATIENT
Start: 2018-03-26 | End: 2018-06-27

## 2018-03-26 NOTE — TELEPHONE ENCOUNTER
Spoke with patient and relayed Dr. Venkatesh Sullivan message. Patient verbalized an understanding to all instructions. Patient notes having slight discharge, but no other urinary symptoms.    She will take Cipro and have repeat urine test.

## 2018-03-26 NOTE — TELEPHONE ENCOUNTER
Tell her urine culture was done on her urine specimen and it came back as bacterial infection. Does she have any sx? Treat with Cipro 250 mg bid for 5 days. Repeat urine culture in about 3 weeks.

## 2018-04-22 ENCOUNTER — APPOINTMENT (OUTPATIENT)
Dept: LAB | Facility: HOSPITAL | Age: 53
End: 2018-04-22
Attending: INTERNAL MEDICINE
Payer: COMMERCIAL

## 2018-04-22 DIAGNOSIS — N39.0 URINARY TRACT INFECTION WITHOUT HEMATURIA, SITE UNSPECIFIED: ICD-10-CM

## 2018-04-22 PROCEDURE — 87086 URINE CULTURE/COLONY COUNT: CPT

## 2018-04-26 ENCOUNTER — PATIENT MESSAGE (OUTPATIENT)
Dept: INTERNAL MEDICINE CLINIC | Facility: CLINIC | Age: 53
End: 2018-04-26

## 2018-04-26 DIAGNOSIS — M1A.9XX1 CHRONIC GOUT WITH TOPHUS, UNSPECIFIED CAUSE, UNSPECIFIED SITE: Primary | ICD-10-CM

## 2018-04-26 NOTE — TELEPHONE ENCOUNTER
From: Reginald Jj  To: Celine Ordaz MD  Sent: 4/26/2018 3:00 PM CDT  Subject: Other    Gout-  Hi Dr Matias Barros  I had a gout flare up on last Friday that was manageable. It was on the left foot on the side of foot.  Although it affected my ability to walk

## 2018-04-27 RX ORDER — METHYLPREDNISOLONE 4 MG/1
TABLET ORAL
Qty: 1 KIT | Refills: 0 | Status: SHIPPED | OUTPATIENT
Start: 2018-04-27 | End: 2018-06-27

## 2018-04-27 NOTE — TELEPHONE ENCOUNTER
Discussed patients [employer] message with Dr. Monae Simmons. Per Dr. Monae Simmons: okay to release urine culture results to [employer]. Okay to generate referral for Dianna Hoskins (knee scooter) d/t gout pain and call in medrol dose claudia to patients pharmacy.  s/w patient and relay

## 2018-05-23 ENCOUNTER — OFFICE VISIT (OUTPATIENT)
Dept: INTERNAL MEDICINE CLINIC | Facility: CLINIC | Age: 53
End: 2018-05-23

## 2018-05-23 VITALS
BODY MASS INDEX: 46.93 KG/M2 | OXYGEN SATURATION: 98 % | WEIGHT: 255 LBS | DIASTOLIC BLOOD PRESSURE: 94 MMHG | HEART RATE: 85 BPM | TEMPERATURE: 99 F | SYSTOLIC BLOOD PRESSURE: 158 MMHG | HEIGHT: 62 IN

## 2018-05-23 DIAGNOSIS — I10 ESSENTIAL HYPERTENSION: Primary | ICD-10-CM

## 2018-05-23 DIAGNOSIS — E78.00 HYPERCHOLESTEREMIA: ICD-10-CM

## 2018-05-23 DIAGNOSIS — M1A.9XX0 CHRONIC GOUT WITHOUT TOPHUS, UNSPECIFIED CAUSE, UNSPECIFIED SITE: ICD-10-CM

## 2018-05-23 DIAGNOSIS — M25.561 PAIN IN JOINT OF RIGHT KNEE: ICD-10-CM

## 2018-05-23 PROCEDURE — 99212 OFFICE O/P EST SF 10 MIN: CPT | Performed by: INTERNAL MEDICINE

## 2018-05-23 PROCEDURE — 99213 OFFICE O/P EST LOW 20 MIN: CPT | Performed by: INTERNAL MEDICINE

## 2018-05-23 RX ORDER — HYDRALAZINE HYDROCHLORIDE 25 MG/1
TABLET, FILM COATED ORAL
Qty: 60 TABLET | Refills: 12 | Status: SHIPPED | OUTPATIENT
Start: 2018-05-23 | End: 2019-08-05

## 2018-05-23 NOTE — PROGRESS NOTES
Adilson Melendez is a 48year old female. HPI:   She feels well, no alarm sx. Good energy good appetite. SR: no chest pain or sob, no gu or gi sx. 1. Essential hypertension  Not at goal.    2. Hypercholesteremia  Labs pending    3.  Chronic gout w tobacco: Never Used                      Alcohol use:  No                 REVIEW OF SYSTEMS:   GENERAL HEALTH: feels well otherwise  SKIN: denies any unusual skin lesions or rashes  RESPIRATORY: denies shortness of breath with exertion  CARDIOVASCULAR: jorge luis

## 2018-05-24 ENCOUNTER — APPOINTMENT (OUTPATIENT)
Dept: LAB | Facility: HOSPITAL | Age: 53
End: 2018-05-24
Attending: INTERNAL MEDICINE
Payer: COMMERCIAL

## 2018-05-24 DIAGNOSIS — E13.9 DIABETES 1.5, MANAGED AS TYPE 2 (HCC): ICD-10-CM

## 2018-05-24 DIAGNOSIS — E78.00 HYPERCHOLESTEREMIA: ICD-10-CM

## 2018-05-24 DIAGNOSIS — I10 ESSENTIAL HYPERTENSION: ICD-10-CM

## 2018-05-24 PROCEDURE — 80061 LIPID PANEL: CPT

## 2018-05-24 PROCEDURE — 36415 COLL VENOUS BLD VENIPUNCTURE: CPT

## 2018-05-24 PROCEDURE — 84550 ASSAY OF BLOOD/URIC ACID: CPT

## 2018-05-24 PROCEDURE — 83036 HEMOGLOBIN GLYCOSYLATED A1C: CPT

## 2018-05-24 PROCEDURE — 80048 BASIC METABOLIC PNL TOTAL CA: CPT

## 2018-05-29 ENCOUNTER — PATIENT MESSAGE (OUTPATIENT)
Dept: INTERNAL MEDICINE CLINIC | Facility: CLINIC | Age: 53
End: 2018-05-29

## 2018-05-29 NOTE — TELEPHONE ENCOUNTER
From: Kieran Roldan  To: Erlin Herbert MD  Sent: 5/29/2018 12:24 PM CDT  Subject: Test Results Question    Hi  Can you guys release my results from my blood test last week  I received the Uric Acid results on Saturday Thank you

## 2018-06-02 ENCOUNTER — TELEPHONE (OUTPATIENT)
Dept: INTERNAL MEDICINE CLINIC | Facility: CLINIC | Age: 53
End: 2018-06-02

## 2018-06-02 DIAGNOSIS — E78.5 HYPERLIPIDEMIA, UNSPECIFIED HYPERLIPIDEMIA TYPE: ICD-10-CM

## 2018-06-02 DIAGNOSIS — E13.9 DIABETES 1.5, MANAGED AS TYPE 2 (HCC): Primary | ICD-10-CM

## 2018-06-04 RX ORDER — METFORMIN HYDROCHLORIDE 500 MG/1
500 TABLET, EXTENDED RELEASE ORAL
Qty: 90 TABLET | Refills: 3 | Status: SHIPPED | OUTPATIENT
Start: 2018-06-04 | End: 2020-12-23

## 2018-06-04 RX ORDER — ATORVASTATIN CALCIUM 10 MG/1
10 TABLET, FILM COATED ORAL NIGHTLY
Qty: 90 TABLET | Refills: 3 | Status: SHIPPED | OUTPATIENT
Start: 2018-06-04 | End: 2018-12-05

## 2018-06-19 ENCOUNTER — TELEPHONE (OUTPATIENT)
Dept: INTERNAL MEDICINE CLINIC | Facility: CLINIC | Age: 53
End: 2018-06-19

## 2018-06-19 ENCOUNTER — PATIENT MESSAGE (OUTPATIENT)
Dept: INTERNAL MEDICINE CLINIC | Facility: CLINIC | Age: 53
End: 2018-06-19

## 2018-06-19 RX ORDER — AZITHROMYCIN 250 MG/1
TABLET, FILM COATED ORAL
Qty: 6 TABLET | Refills: 0 | Status: SHIPPED | OUTPATIENT
Start: 2018-06-19 | End: 2018-06-27

## 2018-06-19 RX ORDER — PROMETHAZINE HYDROCHLORIDE AND CODEINE PHOSPHATE 6.25; 1 MG/5ML; MG/5ML
5 SYRUP ORAL EVERY 4 HOURS PRN
Qty: 180 ML | Refills: 0 | COMMUNITY
Start: 2018-06-19 | End: 2020-04-20

## 2018-06-19 NOTE — TELEPHONE ENCOUNTER
Spoke with patient. She reports cold which started yesterday with sore throat. States, \"It advanced very quickly. \" Today she is coughing up thick yellow phlegm and blowing her nose constantly. Denies SOB, denies fevers/chills.  Last night her BP was 193/1

## 2018-06-19 NOTE — TELEPHONE ENCOUNTER
Spoke with patient and relayed message from Dr. Antelmo Brown. Patient did receive this message via [address] She wonders when she should come in to office to  Rx.  Advised appt as stated by Dr. Kelsie Glover. Patient requests message be forwarded to Dr. Saroj Singhal to [place]

## 2018-06-19 NOTE — TELEPHONE ENCOUNTER
From: Reg Og  To: Ce Gordon MD  Sent: 6/19/2018 10:35 AM CDT  Subject: Prescription Question    Hi  I have a very bad cold and cough  I have HBP  Can Dr. Naveed Taveras prescribe me a cough syrup to  at the [address] [place] [address] in [place]?   Thanks

## 2018-06-19 NOTE — TELEPHONE ENCOUNTER
Regarding: Prescription Question  Contact: [phone]  ----- Message from Farhana De Guzman RN sent at 6/19/2018  2:34 PM CDT -----       ----- Message from Christian nguyen to Johana Holden MD sent at 6/19/2018 10:35 AM -----   Hi  I have a very bad cold

## 2018-06-19 NOTE — TELEPHONE ENCOUNTER
nursing call them, I left this message on mychart, try to reiterate-  The only prescribed  cough syrup has codeines or narcotics, these cannot be sent in over the phone you will have to be seen in the office for this, try to make an appointment.   Or in the

## 2018-06-20 NOTE — TELEPHONE ENCOUNTER
Zpak and Phen c cod # 180 ml/0 called into [facility]. Pt notified. Advised pt if not improving by next week to come in to see Dr. Carlos Alberto Brennan, pt verbalized understanding.

## 2018-06-23 ENCOUNTER — HOSPITAL ENCOUNTER (OUTPATIENT)
Dept: MAMMOGRAPHY | Facility: HOSPITAL | Age: 53
Discharge: HOME | End: 2018-06-23
Attending: INTERNAL MEDICINE
Payer: COMMERCIAL

## 2018-06-23 DIAGNOSIS — Z12.39 BREAST CANCER SCREENING: ICD-10-CM

## 2018-06-23 PROCEDURE — 77067 SCR MAMMO BI INCL CAD: CPT | Performed by: INTERNAL MEDICINE

## 2018-06-27 ENCOUNTER — OFFICE VISIT (OUTPATIENT)
Dept: INTERNAL MEDICINE CLINIC | Facility: CLINIC | Age: 53
End: 2018-06-27

## 2018-06-27 VITALS
HEART RATE: 100 BPM | BODY MASS INDEX: 46.93 KG/M2 | DIASTOLIC BLOOD PRESSURE: 82 MMHG | TEMPERATURE: 99 F | WEIGHT: 255 LBS | HEIGHT: 62 IN | SYSTOLIC BLOOD PRESSURE: 120 MMHG

## 2018-06-27 DIAGNOSIS — I10 ESSENTIAL HYPERTENSION: Primary | ICD-10-CM

## 2018-06-27 DIAGNOSIS — E13.9 DIABETES 1.5, MANAGED AS TYPE 2 (HCC): ICD-10-CM

## 2018-06-27 DIAGNOSIS — E78.00 HYPERCHOLESTEREMIA: ICD-10-CM

## 2018-06-27 PROCEDURE — 99213 OFFICE O/P EST LOW 20 MIN: CPT | Performed by: INTERNAL MEDICINE

## 2018-06-27 PROCEDURE — 99212 OFFICE O/P EST SF 10 MIN: CPT | Performed by: INTERNAL MEDICINE

## 2018-06-27 NOTE — PROGRESS NOTES
Hanny Amador is a 48year old female. HPI:   She decided not to take newly prescribed metformin and atorvastatin. She wants to do lifestyle modification and recheck. She is feeling well other than pain in the right elbow for the last 2-3 months. History:   Diagnosis Date   • Gout    • Torn rotator cuff around 1/2016    Right shoulder   • Unspecified essential hypertension       Social History:  Smoking status: Never Smoker                                                              Smokeless toba

## 2018-08-02 ENCOUNTER — APPOINTMENT (OUTPATIENT)
Dept: LAB | Facility: HOSPITAL | Age: 53
End: 2018-08-02
Attending: INTERNAL MEDICINE
Payer: COMMERCIAL

## 2018-08-02 DIAGNOSIS — E78.5 HYPERLIPIDEMIA, UNSPECIFIED HYPERLIPIDEMIA TYPE: ICD-10-CM

## 2018-08-02 DIAGNOSIS — E13.9 DIABETES 1.5, MANAGED AS TYPE 2 (HCC): ICD-10-CM

## 2018-08-02 LAB
ANION GAP SERPL CALC-SCNC: 9 MMOL/L (ref 0–18)
BUN SERPL-MCNC: 9 MG/DL (ref 8–20)
BUN/CREAT SERPL: 11.5 (ref 10–20)
CALCIUM SERPL-MCNC: 8.9 MG/DL (ref 8.5–10.5)
CHLORIDE SERPL-SCNC: 105 MMOL/L (ref 95–110)
CHOLEST SERPL-MCNC: 192 MG/DL (ref 110–200)
CO2 SERPL-SCNC: 27 MMOL/L (ref 22–32)
CREAT SERPL-MCNC: 0.78 MG/DL (ref 0.5–1.5)
GLUCOSE SERPL-MCNC: 139 MG/DL (ref 70–99)
HBA1C MFR BLD: 6.9 % (ref 4–6)
HDLC SERPL-MCNC: 34 MG/DL
LDLC SERPL CALC-MCNC: 139 MG/DL (ref 0–99)
NONHDLC SERPL-MCNC: 158 MG/DL
OSMOLALITY UR CALC.SUM OF ELEC: 293 MOSM/KG (ref 275–295)
POTASSIUM SERPL-SCNC: 3.9 MMOL/L (ref 3.3–5.1)
SODIUM SERPL-SCNC: 141 MMOL/L (ref 136–144)
TRIGL SERPL-MCNC: 94 MG/DL (ref 1–149)

## 2018-08-02 PROCEDURE — 80048 BASIC METABOLIC PNL TOTAL CA: CPT

## 2018-08-02 PROCEDURE — 80061 LIPID PANEL: CPT

## 2018-08-02 PROCEDURE — 36415 COLL VENOUS BLD VENIPUNCTURE: CPT

## 2018-08-02 PROCEDURE — 83036 HEMOGLOBIN GLYCOSYLATED A1C: CPT

## 2018-08-06 RX ORDER — AMLODIPINE BESYLATE 5 MG/1
5 TABLET ORAL 2 TIMES DAILY
COMMUNITY
End: 2018-08-06

## 2018-08-06 RX ORDER — AMLODIPINE BESYLATE 5 MG/1
5 TABLET ORAL 2 TIMES DAILY
Qty: 180 TABLET | Refills: 3 | Status: SHIPPED | OUTPATIENT
Start: 2018-08-06 | End: 2020-04-20

## 2018-08-06 NOTE — TELEPHONE ENCOUNTER
Patient has heard there is a world wide recall on certain types of Amlodipine. Is what she takes fall into that recall?

## 2018-08-06 NOTE — TELEPHONE ENCOUNTER
To Dr. Shyanne Valero - your message relayed to pt who verbalized understanding. 1) Pt states she has not started metformin or atorvastatin - was trying to bring numbers down with diet.   She will start these medication now as originally ordered: metformin 500mg  o

## 2018-08-06 NOTE — TELEPHONE ENCOUNTER
I checked the FDA website and there is no mention of Amlodipine recall -   Spoke to pt - she is referring to the Valsartan/amlodipine combo that was part of recall  She also states BP is better controlled on BID dosing (last documentation was for 10 mg QD)

## 2018-08-06 NOTE — TELEPHONE ENCOUNTER
Recent labs: diabetes about the same c A1C 6.9 - goal is 6.5 or less - would increase metformin from 1 daily to 1 bid - can take second dose with dinner    LDL chol not at goal - should be < 100 and is 130 - increase atorvastatin from 10 to 20 mg daily.

## 2018-10-24 ENCOUNTER — OFFICE VISIT (OUTPATIENT)
Dept: INTERNAL MEDICINE CLINIC | Facility: CLINIC | Age: 53
End: 2018-10-24
Payer: COMMERCIAL

## 2018-10-24 VITALS
OXYGEN SATURATION: 98 % | HEART RATE: 68 BPM | DIASTOLIC BLOOD PRESSURE: 64 MMHG | SYSTOLIC BLOOD PRESSURE: 128 MMHG | BODY MASS INDEX: 47.11 KG/M2 | WEIGHT: 256 LBS | TEMPERATURE: 98 F | HEIGHT: 62 IN

## 2018-10-24 DIAGNOSIS — E13.9 DIABETES 1.5, MANAGED AS TYPE 2 (HCC): Primary | ICD-10-CM

## 2018-10-24 DIAGNOSIS — I10 ESSENTIAL HYPERTENSION: ICD-10-CM

## 2018-10-24 DIAGNOSIS — E78.00 HYPERCHOLESTEREMIA: ICD-10-CM

## 2018-10-24 DIAGNOSIS — M1A.9XX0 CHRONIC GOUT WITHOUT TOPHUS, UNSPECIFIED CAUSE, UNSPECIFIED SITE: ICD-10-CM

## 2018-10-24 PROCEDURE — 99212 OFFICE O/P EST SF 10 MIN: CPT | Performed by: INTERNAL MEDICINE

## 2018-10-24 PROCEDURE — 99214 OFFICE O/P EST MOD 30 MIN: CPT | Performed by: INTERNAL MEDICINE

## 2018-10-24 NOTE — PROGRESS NOTES
Xochitl Bui is a 48year old female. HPI:   She has been feeling well and no new issues. No ED or UC visits    No gout episodes     Energy good appetite good    DM - A1C 6.9 - she did not start metformin nor atorvastatin.     Recent mammogram normal • Unspecified essential hypertension       Social History:  Social History    Tobacco Use      Smoking status: Never Smoker      Smokeless tobacco: Never Used    Alcohol use: No    Drug use: No       REVIEW OF SYSTEMS:   GENERAL HEALTH: feels well otherw

## 2018-11-21 ENCOUNTER — PATIENT MESSAGE (OUTPATIENT)
Dept: INTERNAL MEDICINE CLINIC | Facility: CLINIC | Age: 53
End: 2018-11-21

## 2018-11-21 NOTE — TELEPHONE ENCOUNTER
From: Cholo Shine  To: Marcy Barraza MD  Sent: 11/21/2018 7:34 AM CST  Subject: Other    Hi  Dr Georgette Beltrán was to put in a standing order for bloodwork for cholesterol and A1C  Can I confirm if that was done?   I want to go get labs in a few days  Please a

## 2018-11-26 ENCOUNTER — PATIENT MESSAGE (OUTPATIENT)
Dept: INTERNAL MEDICINE CLINIC | Facility: CLINIC | Age: 53
End: 2018-11-26

## 2018-11-26 NOTE — TELEPHONE ENCOUNTER
From: Westley Mathew  To: Matt Hastings MD  Sent: 11/26/2018 5:18 PM CST  Subject: Prescription Question    Hi  My blood pressure machine has broken and I use it to really help me manage my pressure  Is there a way to get a prescription for one that I c

## 2018-12-01 ENCOUNTER — APPOINTMENT (OUTPATIENT)
Dept: LAB | Facility: HOSPITAL | Age: 53
End: 2018-12-01
Attending: INTERNAL MEDICINE
Payer: COMMERCIAL

## 2018-12-01 DIAGNOSIS — E78.00 HYPERCHOLESTEREMIA: ICD-10-CM

## 2018-12-01 DIAGNOSIS — E13.9 DIABETES 1.5, MANAGED AS TYPE 2 (HCC): ICD-10-CM

## 2018-12-01 PROCEDURE — 83036 HEMOGLOBIN GLYCOSYLATED A1C: CPT

## 2018-12-01 PROCEDURE — 36415 COLL VENOUS BLD VENIPUNCTURE: CPT

## 2018-12-01 PROCEDURE — 82947 ASSAY GLUCOSE BLOOD QUANT: CPT

## 2018-12-01 PROCEDURE — 80061 LIPID PANEL: CPT

## 2018-12-05 ENCOUNTER — TELEPHONE (OUTPATIENT)
Dept: INTERNAL MEDICINE CLINIC | Facility: CLINIC | Age: 53
End: 2018-12-05

## 2018-12-05 DIAGNOSIS — E13.9 DIABETES 1.5, MANAGED AS TYPE 2 (HCC): ICD-10-CM

## 2018-12-05 DIAGNOSIS — E78.00 HYPERCHOLESTEREMIA: Primary | ICD-10-CM

## 2018-12-05 RX ORDER — ATORVASTATIN CALCIUM 10 MG/1
TABLET, FILM COATED ORAL
Qty: 90 TABLET | Refills: 3 | COMMUNITY
Start: 2018-12-05 | End: 2020-12-23

## 2018-12-05 NOTE — TELEPHONE ENCOUNTER
Diabetes fairly well controlled c A1C 6.8 - ideal is 6.5 or <.    Cholesterol still not at goal -  - goal is < 100. Increase atorvastatin from 10 to 20 mg daily. Repeat FBS, A1C and lipids in 2 months.

## 2018-12-05 NOTE — TELEPHONE ENCOUNTER
Called patient and relayed DR. SHILPA tyson e- left on VM per hipaa , also left message to call if she needs new RX for Atorvastatin 20 mg since it was increased

## 2018-12-16 ENCOUNTER — PATIENT MESSAGE (OUTPATIENT)
Dept: INTERNAL MEDICINE CLINIC | Facility: CLINIC | Age: 53
End: 2018-12-16

## 2018-12-17 NOTE — TELEPHONE ENCOUNTER
From: Sanju Tsang  To: Lupe Mathew MD  Sent: 12/16/2018 11:26 PM CST  Subject: Prescription Question    Hi  [place] wondering if dr Renuka Calvillo could prescribe the pill pack for me to take when I gout kicks up to shorten the

## 2019-02-19 ENCOUNTER — TELEPHONE (OUTPATIENT)
Dept: INTERNAL MEDICINE CLINIC | Facility: CLINIC | Age: 54
End: 2019-02-19

## 2019-02-19 ENCOUNTER — HOSPITAL ENCOUNTER (OUTPATIENT)
Age: 54
Discharge: HOME | End: 2019-02-19
Attending: FAMILY MEDICINE
Payer: COMMERCIAL

## 2019-02-19 VITALS
BODY MASS INDEX: 46.01 KG/M2 | OXYGEN SATURATION: 100 % | HEIGHT: 62 IN | RESPIRATION RATE: 20 BRPM | SYSTOLIC BLOOD PRESSURE: 134 MMHG | DIASTOLIC BLOOD PRESSURE: 71 MMHG | HEART RATE: 76 BPM | WEIGHT: 250 LBS | TEMPERATURE: 99 F

## 2019-02-19 DIAGNOSIS — I10 ESSENTIAL HYPERTENSION: ICD-10-CM

## 2019-02-19 DIAGNOSIS — H81.393 PERIPHERAL VERTIGO OF BOTH EARS: Primary | ICD-10-CM

## 2019-02-19 LAB — GLUCOSE BLDC GLUCOMTR-MCNC: 139 MG/DL (ref 70–99)

## 2019-02-19 PROCEDURE — 82962 GLUCOSE BLOOD TEST: CPT

## 2019-02-19 PROCEDURE — 93010 ELECTROCARDIOGRAM REPORT: CPT

## 2019-02-19 PROCEDURE — 99214 OFFICE O/P EST MOD 30 MIN: CPT

## 2019-02-19 PROCEDURE — 93005 ELECTROCARDIOGRAM TRACING: CPT

## 2019-02-19 PROCEDURE — 93010 ELECTROCARDIOGRAM REPORT: CPT | Performed by: FAMILY MEDICINE

## 2019-02-19 RX ORDER — MECLIZINE HYDROCHLORIDE 25 MG/1
25 TABLET ORAL 3 TIMES DAILY PRN
Qty: 15 TABLET | Refills: 0 | Status: SHIPPED | OUTPATIENT
Start: 2019-02-19 | End: 2019-02-24

## 2019-02-19 NOTE — ED PROVIDER NOTES
Patient Seen in: [address]    History   Patient presents with:  Dizziness (neurologic)    Stated Complaint: dizzy    HPI    Patient is here with symptoms of dizziness, mild nausea, noting high blood pressure at home.   Alaina Oneal 113.4 kg   SpO2 100%   BMI 45.73 kg/m²         Physical Exam   Constitutional: She is oriented to person, place, and time. She appears well-developed and well-nourished. Non-toxic appearance. She does not appear ill. No distress.    HENT:   Head: Normoceph Disposition and Plan     Clinical Impression:  Peripheral vertigo of both ears  (primary encounter diagnosis)  Essential hypertension     Recheck blood pressure in clinic is within normal limits. Recommend continue current blood pressure medications.

## 2019-02-19 NOTE — ED NOTES
Laying down or sitting up, reports the room spinning. When pt is sitting up in chair reports dizziness. Reports dry heaves this morning, no vomiting. Took bp meds this morning. No nystagmus seen.

## 2019-02-19 NOTE — ED INITIAL ASSESSMENT (HPI)
Vertigo symptoms since the weekend, today more severe, with unstable blood pressures reported by pt. Also c/o nausea.

## 2019-02-19 NOTE — TELEPHONE ENCOUNTER
Offered 1:00 Dr. Cholo Prince appt, pt declined as she decided to go to Urgent Care - nursing to f/u in AM.

## 2019-02-19 NOTE — TELEPHONE ENCOUNTER
To Dr. Karen Nunes - See below - BP taken this AM before BP med which was taken at 8 AM.  Pt states she was able to carry on normal routine the last 2 days but today is in bed due to the vertigo.  Denies pain anywhere, diaphoresis, fever (temp is 97 at presen

## 2019-02-19 NOTE — TELEPHONE ENCOUNTER
Pt is calling for the last 3 days she has had vertigo  This morning pt woke the vertigo was bad & she was nauseated  Also this morning her B/P 184/104,  She would like to know what direction she should go,  please call pt [phone]   Tasked to nursing h

## 2019-02-20 ENCOUNTER — TELEPHONE (OUTPATIENT)
Dept: INTERNAL MEDICINE CLINIC | Facility: CLINIC | Age: 54
End: 2019-02-20

## 2019-02-20 NOTE — TELEPHONE ENCOUNTER
Pt was seen in an UC and is coming in March 4th to see Dr MASSEY as a follow up.  Pt states that UC gave her some medication that she is to take for five days, but the medication makes her dizzy and she cannot drive while on the medication, pt is now asking for

## 2019-02-20 NOTE — TELEPHONE ENCOUNTER
Called patient who needed a note for work , was seen in [address] [place] [address] - explained to patient that she needs to call UC so they can write note since she has been seen there , if they do not give her a note she needs to call our office and schedule brianne with one of the

## 2019-02-25 ENCOUNTER — OFFICE VISIT (OUTPATIENT)
Dept: INTERNAL MEDICINE CLINIC | Facility: CLINIC | Age: 54
End: 2019-02-25
Payer: COMMERCIAL

## 2019-02-25 VITALS
BODY MASS INDEX: 47.07 KG/M2 | SYSTOLIC BLOOD PRESSURE: 178 MMHG | TEMPERATURE: 98 F | HEIGHT: 62 IN | OXYGEN SATURATION: 99 % | HEART RATE: 90 BPM | WEIGHT: 255.81 LBS | DIASTOLIC BLOOD PRESSURE: 100 MMHG

## 2019-02-25 DIAGNOSIS — I10 ESSENTIAL HYPERTENSION: ICD-10-CM

## 2019-02-25 DIAGNOSIS — R42 VERTIGO: Primary | ICD-10-CM

## 2019-02-25 PROCEDURE — 99212 OFFICE O/P EST SF 10 MIN: CPT | Performed by: INTERNAL MEDICINE

## 2019-02-25 PROCEDURE — 99214 OFFICE O/P EST MOD 30 MIN: CPT | Performed by: INTERNAL MEDICINE

## 2019-02-25 NOTE — TELEPHONE ENCOUNTER
Per Dr. Sera Tena, please schedule pt in 1600 slot today, 2/25/19. Spoke to pt and pt agreeable to 1600 appointment time. Appointment booked for 1600 2/2/5/19; pt verbalized understanding.

## 2019-02-25 NOTE — TELEPHONE ENCOUNTER
Pt reported ongoing vertigo. Requested earlier appt. Stated she will be flexible and can come in whenever is the first available. Of note, 2 MD approvals today with Dr. Bree Colon at the end of the day.      To Dr. Bree Colon - sam to schedule the patient for 5:

## 2019-02-25 NOTE — PROGRESS NOTES
Itzel Gee is a 48year old female. HPI:   On 2/16 she awakened with acute vertigo and vomited once and a second time the next day, nausea today. Lightheaded today but no vertigo. She has not been able to work or drive.  No tinnitus or difficulty heari Past Medical History:   Diagnosis Date   • Gout    • Torn rotator cuff around 1/2016    Right shoulder   • Unspecified essential hypertension       Social History:  Social History    Tobacco Use      Smoking status: Never Smoker      Smokeless tobacco: N

## 2019-03-04 ENCOUNTER — OFFICE VISIT (OUTPATIENT)
Dept: INTERNAL MEDICINE CLINIC | Facility: CLINIC | Age: 54
End: 2019-03-04
Payer: COMMERCIAL

## 2019-03-04 VITALS
SYSTOLIC BLOOD PRESSURE: 150 MMHG | DIASTOLIC BLOOD PRESSURE: 100 MMHG | BODY MASS INDEX: 47.51 KG/M2 | OXYGEN SATURATION: 100 % | TEMPERATURE: 99 F | HEIGHT: 62 IN | HEART RATE: 93 BPM | WEIGHT: 258.19 LBS

## 2019-03-04 DIAGNOSIS — M1A.9XX0 CHRONIC GOUT WITHOUT TOPHUS, UNSPECIFIED CAUSE, UNSPECIFIED SITE: ICD-10-CM

## 2019-03-04 DIAGNOSIS — R42 VERTIGO: Primary | ICD-10-CM

## 2019-03-04 DIAGNOSIS — I10 ESSENTIAL HYPERTENSION: ICD-10-CM

## 2019-03-04 PROCEDURE — 99213 OFFICE O/P EST LOW 20 MIN: CPT | Performed by: INTERNAL MEDICINE

## 2019-03-04 PROCEDURE — 99212 OFFICE O/P EST SF 10 MIN: CPT | Performed by: INTERNAL MEDICINE

## 2019-03-04 NOTE — PROGRESS NOTES
Jazmyn Wan is a 48year old female. HPI:   She is markedly improved - no vertigo since last Wed -  She is back to work. BP about 140/85 -  She did not take her meds today. SR: no chest pain or sob, no gu or gi sx.     BP Readings from Last 6 Enc History    Tobacco Use      Smoking status: Never Smoker      Smokeless tobacco: Never Used    Alcohol use: No    Drug use: No       REVIEW OF SYSTEMS:   GENERAL HEALTH: feels well otherwise  SKIN: denies any unusual skin lesions or rashes  RESPIRATORY: de

## 2019-04-20 ENCOUNTER — LAB ENCOUNTER (OUTPATIENT)
Dept: LAB | Age: 54
End: 2019-04-20
Attending: INTERNAL MEDICINE
Payer: COMMERCIAL

## 2019-04-20 DIAGNOSIS — I10 ESSENTIAL HYPERTENSION: ICD-10-CM

## 2019-04-20 DIAGNOSIS — E13.9 DIABETES 1.5, MANAGED AS TYPE 2 (HCC): ICD-10-CM

## 2019-04-20 DIAGNOSIS — E78.00 HYPERCHOLESTEREMIA: ICD-10-CM

## 2019-04-20 DIAGNOSIS — M1A.9XX0 CHRONIC GOUT WITHOUT TOPHUS, UNSPECIFIED CAUSE, UNSPECIFIED SITE: ICD-10-CM

## 2019-04-20 PROCEDURE — 36415 COLL VENOUS BLD VENIPUNCTURE: CPT

## 2019-04-20 PROCEDURE — 84443 ASSAY THYROID STIM HORMONE: CPT

## 2019-04-20 PROCEDURE — 80061 LIPID PANEL: CPT

## 2019-04-20 PROCEDURE — 83036 HEMOGLOBIN GLYCOSYLATED A1C: CPT

## 2019-04-20 PROCEDURE — 85025 COMPLETE CBC W/AUTO DIFF WBC: CPT

## 2019-04-20 PROCEDURE — 84550 ASSAY OF BLOOD/URIC ACID: CPT

## 2019-04-20 PROCEDURE — 80053 COMPREHEN METABOLIC PANEL: CPT

## 2019-04-26 ENCOUNTER — OFFICE VISIT (OUTPATIENT)
Dept: INTERNAL MEDICINE CLINIC | Facility: CLINIC | Age: 54
End: 2019-04-26
Payer: COMMERCIAL

## 2019-04-26 VITALS
OXYGEN SATURATION: 99 % | WEIGHT: 254 LBS | SYSTOLIC BLOOD PRESSURE: 134 MMHG | BODY MASS INDEX: 46.74 KG/M2 | HEIGHT: 62 IN | RESPIRATION RATE: 16 BRPM | TEMPERATURE: 98 F | HEART RATE: 80 BPM | DIASTOLIC BLOOD PRESSURE: 82 MMHG

## 2019-04-26 DIAGNOSIS — I10 ESSENTIAL HYPERTENSION: ICD-10-CM

## 2019-04-26 DIAGNOSIS — E13.9 DIABETES 1.5, MANAGED AS TYPE 2 (HCC): Primary | ICD-10-CM

## 2019-04-26 DIAGNOSIS — E78.00 HYPERCHOLESTEREMIA: ICD-10-CM

## 2019-04-26 DIAGNOSIS — R42 VERTIGO: ICD-10-CM

## 2019-04-26 DIAGNOSIS — M1A.9XX0 CHRONIC GOUT WITHOUT TOPHUS, UNSPECIFIED CAUSE, UNSPECIFIED SITE: ICD-10-CM

## 2019-04-26 PROCEDURE — 99212 OFFICE O/P EST SF 10 MIN: CPT | Performed by: INTERNAL MEDICINE

## 2019-04-26 PROCEDURE — 99214 OFFICE O/P EST MOD 30 MIN: CPT | Performed by: INTERNAL MEDICINE

## 2019-04-26 RX ORDER — METHYLPREDNISOLONE 4 MG/1
TABLET ORAL
Qty: 1 KIT | Refills: 0 | Status: SHIPPED | OUTPATIENT
Start: 2019-04-26 | End: 2020-04-20

## 2019-04-26 NOTE — PROGRESS NOTES
Patrick Torrez is a 48year old female. HPI:   She is feeling well and has started a diet and exercise program about 1 week ago. Mild vertigo recurred about 2 weeks ago and gone now. No gout in several months.      She has not taken allopurinol, at Tablet 24 Hr Take 1 tablet (500 mg total) by mouth daily with breakfast. Disp: 90 tablet Rfl: 3      Past Medical History:   Diagnosis Date   • Gout    • Torn rotator cuff around 1/2016    Right shoulder   • Unspecified essential hypertension       Social

## 2019-06-07 ENCOUNTER — APPOINTMENT (OUTPATIENT)
Dept: LAB | Facility: HOSPITAL | Age: 54
End: 2019-06-07
Attending: INTERNAL MEDICINE
Payer: COMMERCIAL

## 2019-06-07 ENCOUNTER — TELEPHONE (OUTPATIENT)
Dept: INTERNAL MEDICINE CLINIC | Facility: CLINIC | Age: 54
End: 2019-06-07

## 2019-06-07 DIAGNOSIS — M1A.9XX0 CHRONIC GOUT WITHOUT TOPHUS, UNSPECIFIED CAUSE, UNSPECIFIED SITE: ICD-10-CM

## 2019-06-07 DIAGNOSIS — E13.9 DIABETES 1.5, MANAGED AS TYPE 2 (HCC): ICD-10-CM

## 2019-06-07 PROCEDURE — 82947 ASSAY GLUCOSE BLOOD QUANT: CPT

## 2019-06-07 PROCEDURE — 83036 HEMOGLOBIN GLYCOSYLATED A1C: CPT

## 2019-06-07 PROCEDURE — 84550 ASSAY OF BLOOD/URIC ACID: CPT

## 2019-06-07 PROCEDURE — 80061 LIPID PANEL: CPT

## 2019-06-07 PROCEDURE — 36415 COLL VENOUS BLD VENIPUNCTURE: CPT

## 2019-06-14 ENCOUNTER — TELEPHONE (OUTPATIENT)
Dept: INTERNAL MEDICINE CLINIC | Facility: CLINIC | Age: 54
End: 2019-06-14

## 2019-06-14 NOTE — TELEPHONE ENCOUNTER
Labs good - BS 94, chol down from 220 to 200 (200 normal )  A1C down from 7.1 to 6.9 -  Goal is 6.5  Uric acid coming down from 7.9 to 6.9 - goal is about < 4.0    These are all good numbers.

## 2019-08-05 RX ORDER — HYDRALAZINE HYDROCHLORIDE 25 MG/1
TABLET, FILM COATED ORAL
Qty: 60 TABLET | Refills: 12 | Status: CANCELLED | OUTPATIENT
Start: 2019-08-05

## 2019-08-05 RX ORDER — HYDRALAZINE HYDROCHLORIDE 25 MG/1
TABLET, FILM COATED ORAL
Qty: 60 TABLET | Refills: 0 | Status: CANCELLED | OUTPATIENT
Start: 2019-08-05

## 2019-08-05 RX ORDER — LABETALOL 100 MG/1
TABLET, FILM COATED ORAL
Qty: 180 TABLET | Refills: 0 | Status: CANCELLED | OUTPATIENT
Start: 2019-08-05

## 2019-08-05 RX ORDER — LABETALOL 100 MG/1
100 TABLET, FILM COATED ORAL 2 TIMES DAILY
Qty: 180 TABLET | Refills: 3 | Status: CANCELLED | OUTPATIENT
Start: 2019-08-05

## 2019-08-06 RX ORDER — LABETALOL 100 MG/1
100 TABLET, FILM COATED ORAL 2 TIMES DAILY
Qty: 180 TABLET | Refills: 3 | Status: SHIPPED | OUTPATIENT
Start: 2019-08-06 | End: 2020-04-20

## 2019-08-06 RX ORDER — HYDRALAZINE HYDROCHLORIDE 25 MG/1
TABLET, FILM COATED ORAL
Qty: 180 TABLET | Refills: 3 | Status: SHIPPED | OUTPATIENT
Start: 2019-08-06 | End: 2020-04-20

## 2020-04-20 RX ORDER — LABETALOL 100 MG/1
100 TABLET, FILM COATED ORAL 2 TIMES DAILY
Qty: 180 TABLET | Refills: 0 | Status: SHIPPED | OUTPATIENT
Start: 2020-04-20 | End: 2020-12-23

## 2020-04-20 RX ORDER — AMLODIPINE BESYLATE 5 MG/1
TABLET ORAL
Qty: 180 TABLET | Refills: 0 | Status: SHIPPED | OUTPATIENT
Start: 2020-04-20 | End: 2020-12-23

## 2020-04-20 RX ORDER — HYDRALAZINE HYDROCHLORIDE 25 MG/1
TABLET, FILM COATED ORAL
Qty: 180 TABLET | Refills: 0 | Status: SHIPPED | OUTPATIENT
Start: 2020-04-20 | End: 2020-12-23

## 2020-04-20 NOTE — TELEPHONE ENCOUNTER
Discussed with DR.C annemarie Rothman,  Dr. Ramona Cartagena reviewed your request.  He would like you to take your blood pressure medications as prescribed.   He would also like you to call the office to set up and appointment in the next three months when you

## 2020-04-20 NOTE — TELEPHONE ENCOUNTER
QUIN to [employer]---    Please assist. Reached out to patient as this has not been prescribed since 2018. Patient response:  \"I am only seeing Dr Georgette Beltrán for all of my medication needs.  I have been using my blood pressure meds sporadically and trying to migrate

## 2020-07-17 RX ORDER — HYDRALAZINE HYDROCHLORIDE 25 MG/1
TABLET, FILM COATED ORAL
Qty: 180 TABLET | Refills: 0 | OUTPATIENT
Start: 2020-07-17

## 2020-07-17 RX ORDER — AMLODIPINE BESYLATE 5 MG/1
5 TABLET ORAL 2 TIMES DAILY
Qty: 180 TABLET | Refills: 0 | OUTPATIENT
Start: 2020-07-17

## 2020-12-17 ENCOUNTER — TELEPHONE (OUTPATIENT)
Dept: INTERNAL MEDICINE CLINIC | Facility: CLINIC | Age: 55
End: 2020-12-17

## 2020-12-17 DIAGNOSIS — E79.0 ELEVATED BLOOD URIC ACID LEVEL: ICD-10-CM

## 2020-12-17 DIAGNOSIS — Z12.31 ENCOUNTER FOR SCREENING MAMMOGRAM FOR MALIGNANT NEOPLASM OF BREAST: Primary | ICD-10-CM

## 2020-12-17 DIAGNOSIS — Z00.00 ANNUAL PHYSICAL EXAM: ICD-10-CM

## 2020-12-17 NOTE — TELEPHONE ENCOUNTER
Scheduled appt w/Dr Muller on 12/23  Pt would like to have labs & mammogram prior    Please call to confirm orders      [phone]

## 2020-12-17 NOTE — TELEPHONE ENCOUNTER
Mammogram and fasting labs ordered per protocol. Central Scheduling phone number given to pt who verbalized understanding.

## 2020-12-22 ENCOUNTER — HOSPITAL ENCOUNTER (OUTPATIENT)
Dept: MAMMOGRAPHY | Age: 55
Discharge: HOME | End: 2020-12-22
Attending: INTERNAL MEDICINE
Payer: COMMERCIAL

## 2020-12-22 ENCOUNTER — LAB ENCOUNTER (OUTPATIENT)
Dept: LAB | Facility: REFERENCE LAB | Age: 55
End: 2020-12-22
Attending: INTERNAL MEDICINE
Payer: COMMERCIAL

## 2020-12-22 DIAGNOSIS — Z12.31 ENCOUNTER FOR SCREENING MAMMOGRAM FOR MALIGNANT NEOPLASM OF BREAST: ICD-10-CM

## 2020-12-22 DIAGNOSIS — Z00.00 ANNUAL PHYSICAL EXAM: ICD-10-CM

## 2020-12-22 DIAGNOSIS — E79.0 ELEVATED BLOOD URIC ACID LEVEL: ICD-10-CM

## 2020-12-22 PROCEDURE — 77067 SCR MAMMO BI INCL CAD: CPT | Performed by: INTERNAL MEDICINE

## 2020-12-22 PROCEDURE — 84443 ASSAY THYROID STIM HORMONE: CPT

## 2020-12-22 PROCEDURE — 80053 COMPREHEN METABOLIC PANEL: CPT

## 2020-12-22 PROCEDURE — 83036 HEMOGLOBIN GLYCOSYLATED A1C: CPT

## 2020-12-22 PROCEDURE — 77063 BREAST TOMOSYNTHESIS BI: CPT | Performed by: INTERNAL MEDICINE

## 2020-12-22 PROCEDURE — 80061 LIPID PANEL: CPT

## 2020-12-22 PROCEDURE — 36415 COLL VENOUS BLD VENIPUNCTURE: CPT

## 2020-12-22 PROCEDURE — 85025 COMPLETE CBC W/AUTO DIFF WBC: CPT

## 2020-12-22 PROCEDURE — 84550 ASSAY OF BLOOD/URIC ACID: CPT

## 2020-12-23 ENCOUNTER — OFFICE VISIT (OUTPATIENT)
Dept: INTERNAL MEDICINE CLINIC | Facility: CLINIC | Age: 55
End: 2020-12-23
Payer: COMMERCIAL

## 2020-12-23 VITALS
BODY MASS INDEX: 45.39 KG/M2 | OXYGEN SATURATION: 99 % | HEIGHT: 62 IN | TEMPERATURE: 98 F | HEART RATE: 76 BPM | SYSTOLIC BLOOD PRESSURE: 138 MMHG | DIASTOLIC BLOOD PRESSURE: 90 MMHG | WEIGHT: 246.63 LBS

## 2020-12-23 DIAGNOSIS — Z00.00 ANNUAL PHYSICAL EXAM: Primary | ICD-10-CM

## 2020-12-23 DIAGNOSIS — I10 ESSENTIAL HYPERTENSION: ICD-10-CM

## 2020-12-23 DIAGNOSIS — E13.9 DIABETES 1.5, MANAGED AS TYPE 2 (HCC): ICD-10-CM

## 2020-12-23 DIAGNOSIS — E78.00 HYPERCHOLESTEREMIA: ICD-10-CM

## 2020-12-23 PROCEDURE — 3008F BODY MASS INDEX DOCD: CPT | Performed by: INTERNAL MEDICINE

## 2020-12-23 PROCEDURE — 3080F DIAST BP >= 90 MM HG: CPT | Performed by: INTERNAL MEDICINE

## 2020-12-23 PROCEDURE — 3075F SYST BP GE 130 - 139MM HG: CPT | Performed by: INTERNAL MEDICINE

## 2020-12-23 PROCEDURE — 99396 PREV VISIT EST AGE 40-64: CPT | Performed by: INTERNAL MEDICINE

## 2020-12-23 RX ORDER — ALLOPURINOL 300 MG/1
300 TABLET ORAL DAILY
Qty: 90 TABLET | Refills: 3 | Status: SHIPPED | OUTPATIENT
Start: 2020-12-23 | End: 2022-01-16

## 2020-12-23 RX ORDER — INDOMETHACIN 50 MG/1
50 CAPSULE ORAL EVERY 6 HOURS PRN
Qty: 30 CAPSULE | Refills: 3 | Status: SHIPPED | OUTPATIENT
Start: 2020-12-23 | End: 2021-07-26

## 2020-12-23 RX ORDER — METFORMIN HYDROCHLORIDE 500 MG/1
500 TABLET, EXTENDED RELEASE ORAL
Qty: 90 TABLET | Refills: 3 | Status: SHIPPED | OUTPATIENT
Start: 2020-12-23 | End: 2021-03-15

## 2020-12-23 RX ORDER — LABETALOL 100 MG/1
TABLET, FILM COATED ORAL
Qty: 270 TABLET | Refills: 0 | Status: SHIPPED | OUTPATIENT
Start: 2020-12-23 | End: 2021-03-24

## 2020-12-23 RX ORDER — ATORVASTATIN CALCIUM 10 MG/1
TABLET, FILM COATED ORAL
Qty: 90 TABLET | Refills: 3 | Status: SHIPPED | OUTPATIENT
Start: 2020-12-23 | End: 2022-01-16

## 2020-12-23 NOTE — PROGRESS NOTES
Cornelio Junior is a 50year old female. HPI:   She is here for annual wellness exam.     She is doing well and no new issues. No ED or UC visits.      DM - she has been feeling well but does not self test. A1C 6.8     -  Goal less than 100    HTN - headaches    EXAM:   /90   Pulse 76   Temp 98.4 °F (36.9 °C)   Ht 5' 2\" (1.575 m)   Wt 246 lb 9.6 oz (111.9 kg)   SpO2 99%   BMI 45.10 kg/m²   GENERAL: well developed, well nourished,in no apparent distress  SKIN: no rashes,no suspicious lesions  HE

## 2021-03-14 ENCOUNTER — PATIENT MESSAGE (OUTPATIENT)
Dept: INTERNAL MEDICINE CLINIC | Facility: CLINIC | Age: 56
End: 2021-03-14

## 2021-03-15 ENCOUNTER — TELEPHONE (OUTPATIENT)
Dept: INTERNAL MEDICINE CLINIC | Facility: CLINIC | Age: 56
End: 2021-03-15

## 2021-03-15 RX ORDER — METHYLPREDNISOLONE 4 MG/1
TABLET ORAL
Qty: 21 TABLET | Refills: 0 | Status: SHIPPED | OUTPATIENT
Start: 2021-03-15 | End: 2021-05-28 | Stop reason: ALTCHOICE

## 2021-03-15 NOTE — TELEPHONE ENCOUNTER
----- Message from Christian nguyen sent at 3/14/2021 12:33 PM CDT -----  Regarding: Non-Urgent Medical Question  Contact: [phone]  Hi Dr. Sammy Juan  I would like to note in my file  I have been taking my Allopurinol faithfully (almost 3 months) and I have

## 2021-03-15 NOTE — TELEPHONE ENCOUNTER
Spoke to patient who reports she has been taking indomethacin 50mg daily and allopurinol 300mg daily for about 3 months. A couple days ago she stated having a gout attack on the L side of her L foot, area is swollen, red and painful.  She is having to use c

## 2021-03-15 NOTE — TELEPHONE ENCOUNTER
Attempted to call pt, NA; If you call pt for Dr.C ring, tell her we can change to Metformin 500 mg SIG 1 tab (split in two pieces) daily; However is pt residing in [facility] or is this for a temp Rx ;   Rx pended and Change pharmacy if appropriate for p

## 2021-03-15 NOTE — TELEPHONE ENCOUNTER
Per Dr.C garcia to give Medrol DP for gout  Spoke to pt - reviewed medrol instructions; She will hold indomethacin and continue allopurinol  We discussed metformin;   She will trial immediate release prep and call with issues

## 2021-03-15 NOTE — TELEPHONE ENCOUNTER
From: Jazmyn Wan  To: Majo Muller MD  Sent: 3/14/2021 12:33 PM CDT  Subject: Non-Urgent Ariella Figueroa  I would like to note in my file I have been taking my Allopurinol faithfully (almost 3 months) and I have had a flare up in t

## 2021-03-15 NOTE — TELEPHONE ENCOUNTER
Not sure where she is living now.   May be someone can hunt this down so she does not run out of her medication

## 2021-03-24 RX ORDER — LABETALOL 100 MG/1
TABLET, FILM COATED ORAL
Qty: 270 TABLET | Refills: 0 | Status: SHIPPED | OUTPATIENT
Start: 2021-03-24 | End: 2021-07-26

## 2021-04-19 ENCOUNTER — TELEPHONE (OUTPATIENT)
Dept: INTERNAL MEDICINE CLINIC | Facility: CLINIC | Age: 56
End: 2021-04-19

## 2021-04-19 NOTE — TELEPHONE ENCOUNTER
Patient has been out of town for one week, was not able to monitor BP readings until today.   Took blood pressure medication Labetalol at 11:00am today ( taking this medication 3 times a day)  Took blood pressure at  12:00pm 111/66  1:00pm 98/61  Patient wa

## 2021-04-19 NOTE — TELEPHONE ENCOUNTER
Patient calling to speak to nurse. Was lightheaded. Took blood pressure medication Labetoalol at 11:00am ( taking this medication 3 times a day)  Took blood pressure at  12:00pm 111/66  1:00pm 98/61  Has been feeling the lightheaded for about a week.   St

## 2021-04-19 NOTE — TELEPHONE ENCOUNTER
Decrease labetalol to 100 mg twice daily. Continue to monitor her blood pressure and check in with us by Wednesday as to how she is doing.

## 2021-05-27 ENCOUNTER — LAB ENCOUNTER (OUTPATIENT)
Dept: LAB | Facility: HOSPITAL | Age: 56
End: 2021-05-27
Attending: INTERNAL MEDICINE
Payer: COMMERCIAL

## 2021-05-27 DIAGNOSIS — E13.9 DIABETES 1.5, MANAGED AS TYPE 2 (HCC): ICD-10-CM

## 2021-05-27 DIAGNOSIS — E78.00 HYPERCHOLESTEREMIA: ICD-10-CM

## 2021-05-27 PROCEDURE — 83036 HEMOGLOBIN GLYCOSYLATED A1C: CPT

## 2021-05-27 PROCEDURE — 80048 BASIC METABOLIC PNL TOTAL CA: CPT

## 2021-05-27 PROCEDURE — 36415 COLL VENOUS BLD VENIPUNCTURE: CPT

## 2021-05-27 PROCEDURE — 80061 LIPID PANEL: CPT

## 2021-05-28 ENCOUNTER — OFFICE VISIT (OUTPATIENT)
Dept: INTERNAL MEDICINE CLINIC | Facility: CLINIC | Age: 56
End: 2021-05-28
Payer: COMMERCIAL

## 2021-05-28 VITALS
OXYGEN SATURATION: 100 % | TEMPERATURE: 98 F | HEART RATE: 81 BPM | BODY MASS INDEX: 45 KG/M2 | SYSTOLIC BLOOD PRESSURE: 124 MMHG | DIASTOLIC BLOOD PRESSURE: 88 MMHG | WEIGHT: 245 LBS

## 2021-05-28 DIAGNOSIS — E13.9 DIABETES 1.5, MANAGED AS TYPE 2 (HCC): Primary | ICD-10-CM

## 2021-05-28 DIAGNOSIS — Z00.00 ANNUAL PHYSICAL EXAM: ICD-10-CM

## 2021-05-28 DIAGNOSIS — I10 ESSENTIAL HYPERTENSION: ICD-10-CM

## 2021-05-28 DIAGNOSIS — E78.00 HYPERCHOLESTEREMIA: ICD-10-CM

## 2021-05-28 DIAGNOSIS — M1A.9XX0 CHRONIC GOUT WITHOUT TOPHUS, UNSPECIFIED CAUSE, UNSPECIFIED SITE: ICD-10-CM

## 2021-05-28 PROCEDURE — 3079F DIAST BP 80-89 MM HG: CPT | Performed by: INTERNAL MEDICINE

## 2021-05-28 PROCEDURE — 3074F SYST BP LT 130 MM HG: CPT | Performed by: INTERNAL MEDICINE

## 2021-05-28 PROCEDURE — 99214 OFFICE O/P EST MOD 30 MIN: CPT | Performed by: INTERNAL MEDICINE

## 2021-05-28 RX ORDER — INDOMETHACIN 50 MG/1
CAPSULE ORAL
Qty: 30 CAPSULE | Refills: 1 | Status: SHIPPED | OUTPATIENT
Start: 2021-05-28

## 2021-05-28 RX ORDER — BLOOD SUGAR DIAGNOSTIC
STRIP MISCELLANEOUS
Qty: 200 STRIP | Refills: 5 | Status: SHIPPED | OUTPATIENT
Start: 2021-05-28

## 2021-05-28 RX ORDER — LANCETS 30 GAUGE
1 EACH MISCELLANEOUS 2 TIMES DAILY
Qty: 200 EACH | Refills: 5 | Status: SHIPPED | OUTPATIENT
Start: 2021-05-28 | End: 2021-07-26

## 2021-05-28 RX ORDER — METHYLPREDNISOLONE 4 MG/1
TABLET ORAL
Qty: 21 TABLET | Refills: 1 | Status: SHIPPED | OUTPATIENT
Start: 2021-05-28

## 2021-05-28 RX ORDER — GLIMEPIRIDE 1 MG/1
TABLET ORAL
Qty: 30 TABLET | Refills: 0 | Status: SHIPPED | OUTPATIENT
Start: 2021-05-28 | End: 2021-07-26

## 2021-05-28 RX ORDER — BLOOD-GLUCOSE METER
EACH MISCELLANEOUS
Qty: 1 KIT | Refills: 1 | Status: SHIPPED | OUTPATIENT
Start: 2021-05-28

## 2021-05-28 NOTE — PROGRESS NOTES
Xochitl Bui is a 49year old female. HPI:   She is here for check up. She had diarrhea about 1 mo ago -  Possibly related to metformin. Diarrhea several times per day -  No blood.   Slowing down a little but still can be up to several times per day No    Drug use: No       REVIEW OF SYSTEMS:   GENERAL HEALTH: feels well otherwise  SKIN: denies any unusual skin lesions or rashes  RESPIRATORY: denies shortness of breath with exertion  CARDIOVASCULAR: denies chest pain on exertion  GI: denies abdominal

## 2021-06-22 NOTE — TELEPHONE ENCOUNTER
LM for pt---would like to confirm labetalol dosing as per DR. MASSEY notes increase to TID 12/2020  Also, see how her BS are after May OV

## 2021-06-23 ENCOUNTER — TELEPHONE (OUTPATIENT)
Dept: INTERNAL MEDICINE CLINIC | Facility: CLINIC | Age: 56
End: 2021-06-23

## 2021-07-23 ENCOUNTER — PATIENT MESSAGE (OUTPATIENT)
Dept: INTERNAL MEDICINE CLINIC | Facility: CLINIC | Age: 56
End: 2021-07-23

## 2021-07-26 ENCOUNTER — TELEPHONE (OUTPATIENT)
Dept: INTERNAL MEDICINE CLINIC | Facility: CLINIC | Age: 56
End: 2021-07-26

## 2021-07-26 ENCOUNTER — PATIENT MESSAGE (OUTPATIENT)
Dept: INTERNAL MEDICINE CLINIC | Facility: CLINIC | Age: 56
End: 2021-07-26

## 2021-07-26 RX ORDER — LABETALOL 100 MG/1
TABLET, FILM COATED ORAL
Qty: 270 TABLET | Refills: 3 | Status: SHIPPED | OUTPATIENT
Start: 2021-07-26

## 2021-07-26 RX ORDER — GLIMEPIRIDE 1 MG/1
TABLET ORAL
Qty: 45 TABLET | Refills: 3 | Status: SHIPPED | OUTPATIENT
Start: 2021-07-26

## 2021-07-26 RX ORDER — LANCETS 30 GAUGE
1 EACH MISCELLANEOUS 2 TIMES DAILY
Qty: 200 EACH | Refills: 3 | Status: SHIPPED | OUTPATIENT
Start: 2021-07-26

## 2021-07-26 NOTE — TELEPHONE ENCOUNTER
From: Leia Stratton  To: Shayy Strong. MD Renzo  Sent: 7/26/2021 8:10 AM CDT  Subject: Prescription Question    It's my blood pressure meds and yes I take it 2 times a days. If my pressure is shaky I may take 3. What about my diabetes meds?   Did you fi

## 2021-07-26 NOTE — TELEPHONE ENCOUNTER
To Dr. Anup Edmonds, I spoke with patient to clarify that she is requesting the labetalol and the glimepiride. She will also need a refill of lancets to [facility]. These are pended for your review per Laura's message of 6/22/21. Thank you.

## 2021-07-26 NOTE — TELEPHONE ENCOUNTER
From: Malathi Rowley  To: Edvin Ratliff. Anup Edmonds MD  Sent: 7/23/2021 10:29 PM CDT  Subject: Prescription Question    Hi  I am tolerating very well!   Readings in morning after 12 hour fast overnight (I.e. eat 8pm dinner and breakfast at 9am) is  Connie between 125-

## 2021-07-26 NOTE — TELEPHONE ENCOUNTER
----- Message from [facility] sent at 7/23/2021 10:29 PM CDT -----  Regarding: Prescription Question  Contact: [phone]  Hi  I am tolerating very well!   Readings in morning after 12 hour fast overnight (I.e. eat 8pm dinner and breakfast at 9am) is

## 2021-07-26 NOTE — TELEPHONE ENCOUNTER
----- Message from [facility] sent at 7/26/2021  8:10 AM CDT -----  Regarding: Prescription Question  Contact: [phone]  It's my blood pressure meds and yes I take it 2 times a days. If my pressure is shaky I may take 3.       What about my diabete

## 2021-07-28 ENCOUNTER — PATIENT MESSAGE (OUTPATIENT)
Dept: INTERNAL MEDICINE CLINIC | Facility: CLINIC | Age: 56
End: 2021-07-28

## 2021-07-28 ENCOUNTER — TELEPHONE (OUTPATIENT)
Dept: INTERNAL MEDICINE CLINIC | Facility: CLINIC | Age: 56
End: 2021-07-28

## 2021-07-28 RX ORDER — GLIMEPIRIDE 1 MG/1
TABLET ORAL
Qty: 30 TABLET | Refills: 0 | OUTPATIENT
Start: 2021-07-28

## 2021-07-28 NOTE — TELEPHONE ENCOUNTER
Called patient and relayed DR. MASSEY message - patient verbalized understanding .  She is out of state and will see if she can find an ENT there

## 2021-07-28 NOTE — TELEPHONE ENCOUNTER
From: Reginald Jj  To: Libertad Mckeon. Matias Barros MD  Sent: 7/28/2021  2:29 PM CDT  Subject: Non-Urgent Ernestina [place] Dr. Matias Barros  I have been having random nose bleeds lately and cannot understand the reason for it. My pressure is not severely high.   An

## 2021-07-28 NOTE — TELEPHONE ENCOUNTER
She soul  see ENT doctor and they may be able to cauterize any site that looks like it could bleed. This is a simple office procedure.   If she is in this area see Dr. Lashawn Momin or one of his group or she can find someone locally by her

## 2021-07-28 NOTE — TELEPHONE ENCOUNTER
To Dr. Catrachito Henriquez to please advise---    The patient sent the [employer] message below. Spoke to patient, reports over the last 2-3 weeks, she has had about 1 nosebleed per week.  They come at random times, and typically the bleeding resolves within about 15-20 min

## 2021-07-28 NOTE — TELEPHONE ENCOUNTER
Current refill request refused due to refill is either a duplicate request or has active refills at the pharmacy. Check previous templates.     Requested Prescriptions     Refused Prescriptions Disp Refills   • glimepiride 1 MG Oral Tab [Pharmacy Med Name:

## 2021-08-06 RX ORDER — LABETALOL 100 MG/1
TABLET, FILM COATED ORAL
Qty: 270 TABLET | Refills: 0 | OUTPATIENT
Start: 2021-08-06

## 2021-12-16 NOTE — TELEPHONE ENCOUNTER
Medrol dosepak Physical Therapy  Visit Type: initial evaluation  Treatment diagnosis: Decreased functional mobility  Precautions:  Medical precautions: ; standard precautions. Pt. sustained a left femoral neck fracture about 6 weeks ago and is s/p left femur ORIF 12/15/21  Lines:     Basic: capped IV      Lines in chart and on patient reviewed, precautions maintained throughout session.  Lower Extremity:    Left:  weight bearing: as tolerated.    VANESSA Olmedo RN, approved session  Shayla PT student, present for session  Collaboration with BRANDEN Reece, regarding recommendations for outpatient PT    Patient agreed to participate in therapy this date.  Pt. reports that she is doing okay but wants to move around more.  Pt. reports that she just got crutches from urgent care a few days ago, but pt reports that she prefers the walker for walking and states that she would like to have a walker for home because it helps her feel more safe, especially with a shower transfer.   Prior treatment in the past year for same condition: no therapiesPatient has not been hospitalized, in a skilled nursing facility, or seen by home health in the last 30 days.  Patient / Family Goal: return home and return to previous functional status    Pain     Location: no pain at rest and pt rating pain 1/10 in left hip with ambulation.     At onset of session (out of 10): 0  RN informed on pain level     OBJECTIVE   Level of consciousness: alert    Oriented to person, place and time     Arousal alertness: appropriate responses to stimuli  Patient activity tolerance: 1 to 1 activity to rest  Incision/Wound:   - Location: Left Hip, covered by dressing    Range of Motion (measured in degrees unless otherwise noted, active unless indicated)  WFL: RLE, LLE, except as noted  Comments / Details: Left hip limited s/p ORIF and pain.     Strength (out of 5 unless otherwise indicated)   WFL: RLE  Comments / Details:  Left LE limited s/p ORIF with pt needing  increased time to move left LE during mobility, using UEs to assist left LE with car transfer.   Balance (trials measured in sec unless otherwise indicted)    Sitting: Static: independent double lower extremity support, Dynamic: independent double lower extremity support    Standing - Firm Surface - Eyes Open: Static: stand by assist double upper extremity support  Dynamic: stand by assist double upper extremity support  Balance Details: Bilateral UE support on 2 wheeled walker for static and dynamic standing balance.     Bed Mobility:      Training completed:      Discussed bed mobility, and pt has no concerns about bed mobility upon discharge, stating that she was able to get in and out of bed on her own this morning, politely declining trial of bed mobility.   Transfers:    Assistive devices: gait belt and 2-wheeled walker    Sit to stand: stand by assist    Stand to sit: stand by assist    Car: stand by assist  Training completed:    Tasks: sit to stand, stand to sit and car    Cues for sequencing and hand placements.  Pt. initially standing with hands pulling up on walker, but pt was cued to sit back down and push up from walker.  Pt. was able to safely perform transfers in this manner.  Pt. also cued for hand placements to reach back for chair with stand to sit transfer.   Gait/Ambulation:     Assistance: contact guard/touching/steadying assist (progressing to stand by assist throughout session)   Assistive device: gait belt and 2-wheeled walker    Distance (ft): 200; 200    Pattern: step to (progressing to step through pattern)    Type: antalgic    Swing phase: Left: decreased step length; Right: decreased step length    Gait description: Left: decreased stance time;   Training Completed:    Tasks: gait training on level surfaces    Pt. initially ambulating with left knee flexed and placing weight only through left forefoot.  However, with cues and demonstration for heel/toe pattern, pt able to improve  ambulation quality, but pt resorted back to her original sequencing during session and needed cues to correct.  Pt. also demonstrating increased flexed trunk posture and weight bearing heavily onto walker with anterior pelvic tilt.  Therapist brought this to patient's attention and pt given cues and demonstration to correct.  Pt. reports that her walking pattern was very altered over the last 6 weeks due to worsening pain and immobility.  Stair Mobility:    Number of steps: 4; 4;     Assistance: contact guard/touching/steadying assist (progressing to stand by assist)    Rails: left rail only    Pattern: step to and forward    Stair AD: crutch opposite side of railing with pt holding handle of second crutch to transport this crutch up/down stairs.  Training completed:    Tasks: stair training    Cues for sequencing and use of crutches.  Pt. also cued to keep posture upright instead of flexing knees when descending stairs.  Pt feels comfortable with stairs after second trial.  Pt. educated to have family carry walker up/down stairs for pt so that pt can utilize crutches on stairs.       Interventions     Training provided: bed mobility training, balance retraining, gait training, safety training, transfer training, activity tolerance, stair training, compensatory techniques and use of assistive device    Skilled input: Verbal instruction/cues  Verbal Consent: Writer verbally educated and received verbal consent for hand placement, positioning of patient, and techniques to be performed today from patient        ASSESSMENT    Impairments: strength, balance deficits, activity tolerance and pain  Functional Limitations: all functional mobility    Patient presents to physical therapy below baseline level of modified independent, POD#1 s/p left femur ORIF.  Pt. is demonstrating decreased strength, balance, activity tolerance, and pain, which are limiting the completion of all functional mobility at this time.  Further  skilled physical therapy is required to address these limitations in attempt to maximize the patient's functional independence.   Recommend pt discharge home and follow up with outpatient PT after 2 week appointment with surgeon.  Therapist did reach out to PA who recommended that pt wait to start outpatient PT until after her 2 week follow up.       Discharge Recommendations  Recommendation for Discharge: PT WI: Home,OP therapy (OP therapy to start after follow up kaitlin PETE on 12/30, per PA)         PT/OT Mobility Equipment for Discharge: 2 wheeled walker - order placed and walker was delivered to pt and fit to proper height.  Pt. also has crutches for use on stairs.                      Skilled therapy is required to address these limitations in attempt to maximize the patient's independence.  Progress: progressing toward goals    Pain at end of session: RN informed on pain level 1/10, location: left hip  Predicted patient presentation: Low (stable) - Patient comorbidities and complexities, as defined above, will have little effect on progress for prescribed plan of care.    End of Session:   Location: in chair  Safety measures: call light within reach and lines intact  Handoff to: nurse  Education Provided:   Learning assessment:  - Primary learner: patient  - Are they ready to learn: yes  - Preferred learning style: verbal  - Barriers to learning: no barriers apparent at this time  Education provided during session:  - Receiving education: patient  - Results of above outlined education: Verbalizes understanding    PLAN    Suggestions for next session as indicated:   bed mobility assessment, review transfers, progress ambulation distance focusing on ambulation quality with wheeled walker, review stairs with crutch and railing, carrying second crutch up/down.    PT Frequency: 5 days/week  Frequency Comments: 1          Interventions: bed mobility, gait training, stairs retraining, strengthening, balance,  patient/family training, safety education, functional transfer training and HEP train/position  Agreement to plan and goals: patient agrees with goals and treatment plan        GOALS  Review Date: 12/19/2021  Long Term Goals: (to be met by time of discharge from hospital)  Sit to supine: Patient will complete sit to supine modified independent.  Status: not met  Supine to sit: Patient will complete supine to sit modified independent.  Status: not met  Sit to stand: Patient will complete sit to stand transfer with 2-wheeled walker, modified independent.   Status: progressing/ongoing  Stand to sit: Patient will complete stand to sit transfer with 2-wheeled walker, modified independent.   Status: progressing/ongoing  Ambulation (even): Patient will ambulate on even surface for 150 feet with 2-wheeled walker, modified independent.   Status: progressing/ongoing  Stairs: Patient will ambulate 12 steps with using one rail, modified independent.  Status: progressing/ongoingFree number device: and crutch.    Home program: patient independent with home program as instructed.   Status: not met    Patient will demonstrate and/or verbalize understanding of car transfer completion.  Met.  Documented in the chart in the following areas: Assessment.      Admitting diagnosis: Closed left hip fracture, initial encounter (CMS/[facility]) (S72.002A)    Co-morbidities and problem list:   Patient Active Problem List:   Adnexal mass   Abnormal pelvic ultrasound    Treatment Diagnosis: Decreased functional mobility    The referring provider's electronic signature on the evaluation authorizes the therapy plan of care and certifies the need for these services, furnished under this plan of care while under their care.      Therapy procedure time and total treatment time can be found documented on the Time Entry flowsheet

## 2022-01-16 RX ORDER — ALLOPURINOL 300 MG/1
TABLET ORAL
Qty: 90 TABLET | Refills: 3 | Status: SHIPPED | OUTPATIENT
Start: 2022-01-16

## 2022-01-16 RX ORDER — ATORVASTATIN CALCIUM 10 MG/1
TABLET, FILM COATED ORAL
Qty: 90 TABLET | Refills: 3 | Status: SHIPPED | OUTPATIENT
Start: 2022-01-16

## 2022-02-28 ENCOUNTER — PATIENT MESSAGE (OUTPATIENT)
Dept: INTERNAL MEDICINE CLINIC | Facility: CLINIC | Age: 57
End: 2022-02-28

## 2022-03-01 ENCOUNTER — TELEPHONE (OUTPATIENT)
Dept: INTERNAL MEDICINE CLINIC | Facility: CLINIC | Age: 57
End: 2022-03-01

## 2022-03-01 RX ORDER — HYDROCODONE BITARTRATE AND ACETAMINOPHEN 5; 325 MG/1; MG/1
1 TABLET ORAL EVERY 8 HOURS PRN
Qty: 30 TABLET | Refills: 0 | Status: SHIPPED | OUTPATIENT
Start: 2022-03-01

## 2022-03-01 NOTE — TELEPHONE ENCOUNTER
Spoke with patient and scheduled an appt for Monday, 3/7 at 12pm for her physical. Blood work and mammogram ordered per protocol. Provided patient with central scheduling number to schedule mammogram.     TO Dr. Giana Moody-- patient asking for refill on [place] prior to appt on Monday. Takes very rarely PRN. Last prescribed in 2017 so the ones she has are . Pending below.

## 2022-03-02 ENCOUNTER — TELEPHONE (OUTPATIENT)
Dept: INTERNAL MEDICINE CLINIC | Facility: CLINIC | Age: 57
End: 2022-03-02

## 2022-03-02 ENCOUNTER — PATIENT MESSAGE (OUTPATIENT)
Dept: INTERNAL MEDICINE CLINIC | Facility: CLINIC | Age: 57
End: 2022-03-02

## 2022-03-02 ENCOUNTER — HOSPITAL ENCOUNTER (OUTPATIENT)
Dept: MAMMOGRAPHY | Age: 57
Discharge: HOME | End: 2022-03-02
Attending: INTERNAL MEDICINE
Payer: COMMERCIAL

## 2022-03-02 ENCOUNTER — LAB ENCOUNTER (OUTPATIENT)
Dept: LAB | Facility: HOSPITAL | Age: 57
End: 2022-03-02
Attending: INTERNAL MEDICINE
Payer: COMMERCIAL

## 2022-03-02 DIAGNOSIS — Z12.31 ENCOUNTER FOR SCREENING MAMMOGRAM FOR MALIGNANT NEOPLASM OF BREAST: ICD-10-CM

## 2022-03-02 DIAGNOSIS — E79.0 ELEVATED BLOOD URIC ACID LEVEL: ICD-10-CM

## 2022-03-02 DIAGNOSIS — E13.9 DIABETES 1.5, MANAGED AS TYPE 2 (HCC): ICD-10-CM

## 2022-03-02 DIAGNOSIS — E78.00 HYPERCHOLESTEREMIA: ICD-10-CM

## 2022-03-02 DIAGNOSIS — I10 ESSENTIAL HYPERTENSION: ICD-10-CM

## 2022-03-02 LAB
ALBUMIN SERPL-MCNC: 3.7 G/DL (ref 3.4–5)
ALBUMIN/GLOB SERPL: 0.9 {RATIO} (ref 1–2)
ALP LIVER SERPL-CCNC: 90 U/L
ALT SERPL-CCNC: 31 U/L
ANION GAP SERPL CALC-SCNC: 5 MMOL/L (ref 0–18)
AST SERPL-CCNC: 17 U/L (ref 15–37)
BASOPHILS # BLD AUTO: 0.04 X10(3) UL (ref 0–0.2)
BASOPHILS NFR BLD AUTO: 0.6 %
BILIRUB SERPL-MCNC: 0.4 MG/DL (ref 0.1–2)
BILIRUB UR QL: NEGATIVE
BUN BLD-MCNC: 12 MG/DL (ref 7–18)
BUN/CREAT SERPL: 15.6 (ref 10–20)
CALCIUM BLD-MCNC: 9.5 MG/DL (ref 8.5–10.1)
CHLORIDE SERPL-SCNC: 108 MMOL/L (ref 98–112)
CHOLEST SERPL-MCNC: 175 MG/DL (ref ?–200)
CLARITY UR: CLEAR
CO2 SERPL-SCNC: 31 MMOL/L (ref 21–32)
COLOR UR: YELLOW
CREAT BLD-MCNC: 0.77 MG/DL
DEPRECATED RDW RBC AUTO: 46.6 FL (ref 35.1–46.3)
EOSINOPHIL # BLD AUTO: 0.09 X10(3) UL (ref 0–0.7)
EOSINOPHIL NFR BLD AUTO: 1.3 %
ERYTHROCYTE [DISTWIDTH] IN BLOOD BY AUTOMATED COUNT: 14.7 % (ref 11–15)
EST. AVERAGE GLUCOSE BLD GHB EST-MCNC: 143 MG/DL (ref 68–126)
FASTING PATIENT LIPID ANSWER: YES
GLOBULIN PLAS-MCNC: 3.9 G/DL (ref 2.8–4.4)
GLUCOSE BLD-MCNC: 116 MG/DL (ref 70–99)
GLUCOSE UR-MCNC: NEGATIVE MG/DL
HBA1C MFR BLD: 6.6 % (ref ?–5.7)
HCT VFR BLD AUTO: 46.4 %
HDLC SERPL-MCNC: 49 MG/DL (ref 40–59)
HGB BLD-MCNC: 14.2 G/DL
IMM GRANULOCYTES # BLD AUTO: 0.02 X10(3) UL (ref 0–1)
IMM GRANULOCYTES NFR BLD: 0.3 %
KETONES UR-MCNC: NEGATIVE MG/DL
LDLC SERPL CALC-MCNC: 109 MG/DL (ref ?–100)
LEUKOCYTE ESTERASE UR QL STRIP.AUTO: NEGATIVE
LYMPHOCYTES # BLD AUTO: 2.08 X10(3) UL (ref 1–4)
LYMPHOCYTES NFR BLD AUTO: 30.9 %
MCHC RBC AUTO-ENTMCNC: 30.6 G/DL (ref 31–37)
MCV RBC AUTO: 86.6 FL
MONOCYTES # BLD AUTO: 0.53 X10(3) UL (ref 0.1–1)
MONOCYTES NFR BLD AUTO: 7.9 %
NEUTROPHILS # BLD AUTO: 3.98 X10 (3) UL (ref 1.5–7.7)
NEUTROPHILS # BLD AUTO: 3.98 X10(3) UL (ref 1.5–7.7)
NEUTROPHILS NFR BLD AUTO: 59 %
NITRITE UR QL STRIP.AUTO: NEGATIVE
OSMOLALITY SERPL CALC.SUM OF ELEC: 299 MOSM/KG (ref 275–295)
PH UR: 6 [PH] (ref 5–8)
PLATELET # BLD AUTO: 225 10(3)UL (ref 150–450)
POTASSIUM SERPL-SCNC: 4.6 MMOL/L (ref 3.5–5.1)
PROT SERPL-MCNC: 7.6 G/DL (ref 6.4–8.2)
PROT UR-MCNC: NEGATIVE MG/DL
RBC # BLD AUTO: 5.36 X10(6)UL
SODIUM SERPL-SCNC: 144 MMOL/L (ref 136–145)
SP GR UR STRIP: 1.02 (ref 1–1.03)
TRIGL SERPL-MCNC: 95 MG/DL (ref 30–149)
TSI SER-ACNC: 1.73 MIU/ML (ref 0.36–3.74)
URATE SERPL-MCNC: 6 MG/DL
UROBILINOGEN UR STRIP-ACNC: <2
VLDLC SERPL CALC-MCNC: 16 MG/DL (ref 0–30)
WBC # BLD AUTO: 6.7 X10(3) UL (ref 4–11)

## 2022-03-02 PROCEDURE — 85025 COMPLETE CBC W/AUTO DIFF WBC: CPT

## 2022-03-02 PROCEDURE — 80053 COMPREHEN METABOLIC PANEL: CPT

## 2022-03-02 PROCEDURE — 36415 COLL VENOUS BLD VENIPUNCTURE: CPT

## 2022-03-02 PROCEDURE — 84443 ASSAY THYROID STIM HORMONE: CPT

## 2022-03-02 PROCEDURE — 80061 LIPID PANEL: CPT

## 2022-03-02 PROCEDURE — 77063 BREAST TOMOSYNTHESIS BI: CPT | Performed by: INTERNAL MEDICINE

## 2022-03-02 PROCEDURE — 3044F HG A1C LEVEL LT 7.0%: CPT | Performed by: INTERNAL MEDICINE

## 2022-03-02 PROCEDURE — 77067 SCR MAMMO BI INCL CAD: CPT | Performed by: INTERNAL MEDICINE

## 2022-03-02 PROCEDURE — 84550 ASSAY OF BLOOD/URIC ACID: CPT

## 2022-03-02 PROCEDURE — 81001 URINALYSIS AUTO W/SCOPE: CPT

## 2022-03-02 PROCEDURE — 83036 HEMOGLOBIN GLYCOSYLATED A1C: CPT

## 2022-03-02 NOTE — TELEPHONE ENCOUNTER
Spoke to pt regarding mychart message below. Per 7/28/21 Karmen posey  see ENT doctor and they may be able to cauterize any site that looks like it could bleed. This is a simple office procedure. If she is in this area see Dr. Garfield Roman or one of his group or she can find someone locally by her\". Pt has not seen an ENT in the meantime. She reports the nosebleeds have been ongoing intermittently since we spoke with her in July. Her last nosebleed was about 2 weeks ago where clots were present. She has been out of state but is back in town for the next 5 days. She was hoping to get a recommendation for ENT in case she is able to get in with them while she is in town. Per above, advised Dr. Gregg Mancia office. Provided with contact info. Pt denies any SOB, lightheadedness, dizziness, chest discomfort, etc. Advised the above symptoms occur, or with any nose bleed that is not stopping, to seek ER evaluation. Pt verbalized understanding. She will contact Dr. Gregg Mancia office. If unable to be seen while in town, she will look for an ENT in [place] [facility]. FYI to Dr. Salomon Ochoa, pt has physical scheduled with you on Monday 3/7.

## 2022-03-07 ENCOUNTER — OFFICE VISIT (OUTPATIENT)
Dept: INTERNAL MEDICINE CLINIC | Facility: CLINIC | Age: 57
End: 2022-03-07
Payer: COMMERCIAL

## 2022-03-07 VITALS
HEART RATE: 88 BPM | DIASTOLIC BLOOD PRESSURE: 106 MMHG | WEIGHT: 245 LBS | TEMPERATURE: 98 F | OXYGEN SATURATION: 99 % | BODY MASS INDEX: 45.08 KG/M2 | HEIGHT: 62 IN | SYSTOLIC BLOOD PRESSURE: 180 MMHG

## 2022-03-07 DIAGNOSIS — E13.9 DIABETES 1.5, MANAGED AS TYPE 2 (HCC): ICD-10-CM

## 2022-03-07 DIAGNOSIS — E78.00 HYPERCHOLESTEREMIA: ICD-10-CM

## 2022-03-07 DIAGNOSIS — M1A.9XX0 CHRONIC GOUT WITHOUT TOPHUS, UNSPECIFIED CAUSE, UNSPECIFIED SITE: ICD-10-CM

## 2022-03-07 DIAGNOSIS — I10 PRIMARY HYPERTENSION: ICD-10-CM

## 2022-03-07 DIAGNOSIS — Z00.00 ANNUAL PHYSICAL EXAM: Primary | ICD-10-CM

## 2022-03-07 PROCEDURE — 3080F DIAST BP >= 90 MM HG: CPT | Performed by: INTERNAL MEDICINE

## 2022-03-07 PROCEDURE — 99396 PREV VISIT EST AGE 40-64: CPT | Performed by: INTERNAL MEDICINE

## 2022-03-07 PROCEDURE — 3077F SYST BP >= 140 MM HG: CPT | Performed by: INTERNAL MEDICINE

## 2022-03-07 PROCEDURE — 3008F BODY MASS INDEX DOCD: CPT | Performed by: INTERNAL MEDICINE

## 2022-03-07 RX ORDER — HYDRALAZINE HYDROCHLORIDE 25 MG/1
TABLET, FILM COATED ORAL
Qty: 180 TABLET | Refills: 3 | Status: SHIPPED | OUTPATIENT
Start: 2022-03-07

## 2022-03-16 ENCOUNTER — TELEPHONE (OUTPATIENT)
Dept: INTERNAL MEDICINE CLINIC | Facility: CLINIC | Age: 57
End: 2022-03-16

## 2022-03-16 RX ORDER — PROMETHAZINE HYDROCHLORIDE AND CODEINE PHOSPHATE 6.25; 1 MG/5ML; MG/5ML
5 SYRUP ORAL EVERY 4 HOURS PRN
Qty: 180 ML | Refills: 1 | Status: SHIPPED | OUTPATIENT
Start: 2022-03-16 | End: 2022-03-26

## 2022-03-16 NOTE — TELEPHONE ENCOUNTER
[facility] kailyn MOSHER denial for Hydrocodone-Acetaminophen 5 - 325 mg tab  Placed in red folder

## 2022-03-16 NOTE — TELEPHONE ENCOUNTER
It does not appear that our office received the original PA request. Unable to locate in purple/red folder. Per [place] , 30 tablets of norco were already dispensed on 3/1/22. Disregard PA at this time.

## 2022-03-16 NOTE — TELEPHONE ENCOUNTER
Give Z claudia, OTC Robitussin    Can try Tessalon pearles 100 mg , #20, 1 tid  -  These are very small and work well.

## 2022-03-17 RX ORDER — AZITHROMYCIN 250 MG/1
TABLET, FILM COATED ORAL
Qty: 6 TABLET | Refills: 0 | Status: SHIPPED | OUTPATIENT
Start: 2022-03-17 | End: 2022-03-22

## 2022-03-17 NOTE — TELEPHONE ENCOUNTER
Spoke to patient and relayed MD message. [facility] [place] ([facility] with pt)  Pt verbalized understanding and agrees with plan.

## 2022-03-20 ENCOUNTER — PATIENT MESSAGE (OUTPATIENT)
Dept: INTERNAL MEDICINE CLINIC | Facility: CLINIC | Age: 57
End: 2022-03-20

## 2022-03-21 ENCOUNTER — TELEPHONE (OUTPATIENT)
Dept: INTERNAL MEDICINE CLINIC | Facility: CLINIC | Age: 57
End: 2022-03-21

## 2022-03-21 NOTE — TELEPHONE ENCOUNTER
----- Message from Maribell Chamorro sent at 3/20/2022  8:48 PM CDT -----  Regarding: Active Gout  For my file- I have an active Gout flare up which started on Friday with tenderness in right ankle. Saturday evening I realized it was activated with pain level of 5out of 5. Taking indomethozine and pain pill when needed  I stopped taking cough syrup and I am not mixing any other meds. Not sure of interactions so I Am actively managing the gout due to the severe pain    When I was sick last week I only could drink about 8 oz of water if that because of my throat. I feel that had a big impact of activating Gout along with illness and stress. I am now flushing with lots of water  Sunday (today) I cannot bear weight and I am using a walker to and from the bathroom. Thanks!

## 2022-03-21 NOTE — TELEPHONE ENCOUNTER
From: Mirela Christianson  To: Lee Barbour. MD Renzo  Sent: 3/20/2022 8:48 PM CDT  Subject: Active Gout    For my file- I have an active Gout flare up which started on Friday with tenderness in right ankle. Saturday evening I realized it was activated with pain level of 5out of 5. Taking indomethozine and pain pill when needed  I stopped taking cough syrup and I am not mixing any other meds. Not sure of interactions so I Am actively managing the gout due to the severe pain    When I was sick last week I only could drink about 8 oz of water if that because of my throat. I feel that had a big impact of activating Gout along with illness and stress. I am now flushing with lots of water  Sunday (today) I cannot bear weight and I am using a walker to and from the bathroom. Thanks!

## 2022-03-22 RX ORDER — METHYLPREDNISOLONE 4 MG/1
TABLET ORAL
Qty: 21 EACH | Refills: 0 | Status: SHIPPED | OUTPATIENT
Start: 2022-03-22

## 2022-03-22 NOTE — TELEPHONE ENCOUNTER
Relayed MD message to pt, verified pharmacy. Medrol dospak sent to pharmacy. Indomethacin MAYRA'cecilio.

## 2022-06-15 ENCOUNTER — LAB ENCOUNTER (OUTPATIENT)
Dept: LAB | Facility: REFERENCE LAB | Age: 57
End: 2022-06-15
Attending: INTERNAL MEDICINE
Payer: COMMERCIAL

## 2022-06-15 ENCOUNTER — OFFICE VISIT (OUTPATIENT)
Dept: INTERNAL MEDICINE CLINIC | Facility: CLINIC | Age: 57
End: 2022-06-15
Payer: COMMERCIAL

## 2022-06-15 VITALS
DIASTOLIC BLOOD PRESSURE: 86 MMHG | BODY MASS INDEX: 44.9 KG/M2 | OXYGEN SATURATION: 99 % | WEIGHT: 244 LBS | SYSTOLIC BLOOD PRESSURE: 150 MMHG | HEART RATE: 100 BPM | TEMPERATURE: 98 F | HEIGHT: 62 IN

## 2022-06-15 DIAGNOSIS — E78.00 HYPERCHOLESTEREMIA: ICD-10-CM

## 2022-06-15 DIAGNOSIS — I10 PRIMARY HYPERTENSION: ICD-10-CM

## 2022-06-15 DIAGNOSIS — M54.12 CERVICAL NEUROPATHIC PAIN: ICD-10-CM

## 2022-06-15 DIAGNOSIS — M1A.9XX0 CHRONIC GOUT WITHOUT TOPHUS, UNSPECIFIED CAUSE, UNSPECIFIED SITE: ICD-10-CM

## 2022-06-15 DIAGNOSIS — E13.9 DIABETES 1.5, MANAGED AS TYPE 2 (HCC): ICD-10-CM

## 2022-06-15 DIAGNOSIS — Z00.00 ANNUAL PHYSICAL EXAM: Primary | ICD-10-CM

## 2022-06-15 LAB
ANION GAP SERPL CALC-SCNC: 4 MMOL/L (ref 0–18)
BUN BLD-MCNC: 10 MG/DL (ref 7–18)
BUN/CREAT SERPL: 11.5 (ref 10–20)
CALCIUM BLD-MCNC: 9.5 MG/DL (ref 8.5–10.1)
CHLORIDE SERPL-SCNC: 107 MMOL/L (ref 98–112)
CHOLEST SERPL-MCNC: 208 MG/DL (ref ?–200)
CO2 SERPL-SCNC: 29 MMOL/L (ref 21–32)
CREAT BLD-MCNC: 0.87 MG/DL
EST. AVERAGE GLUCOSE BLD GHB EST-MCNC: 143 MG/DL (ref 68–126)
FASTING PATIENT LIPID ANSWER: YES
FASTING STATUS PATIENT QL REPORTED: YES
GLUCOSE BLD-MCNC: 93 MG/DL (ref 70–99)
HBA1C MFR BLD: 6.6 % (ref ?–5.7)
HDLC SERPL-MCNC: 47 MG/DL (ref 40–59)
LDLC SERPL CALC-MCNC: 139 MG/DL (ref ?–100)
NONHDLC SERPL-MCNC: 161 MG/DL (ref ?–130)
OSMOLALITY SERPL CALC.SUM OF ELEC: 289 MOSM/KG (ref 275–295)
POTASSIUM SERPL-SCNC: 3.5 MMOL/L (ref 3.5–5.1)
SODIUM SERPL-SCNC: 140 MMOL/L (ref 136–145)
TRIGL SERPL-MCNC: 121 MG/DL (ref 30–149)
URATE SERPL-MCNC: 7.8 MG/DL
VLDLC SERPL CALC-MCNC: 22 MG/DL (ref 0–30)

## 2022-06-15 PROCEDURE — 3077F SYST BP >= 140 MM HG: CPT | Performed by: INTERNAL MEDICINE

## 2022-06-15 PROCEDURE — 3079F DIAST BP 80-89 MM HG: CPT | Performed by: INTERNAL MEDICINE

## 2022-06-15 PROCEDURE — 36415 COLL VENOUS BLD VENIPUNCTURE: CPT

## 2022-06-15 PROCEDURE — 83036 HEMOGLOBIN GLYCOSYLATED A1C: CPT

## 2022-06-15 PROCEDURE — 80048 BASIC METABOLIC PNL TOTAL CA: CPT

## 2022-06-15 PROCEDURE — 3044F HG A1C LEVEL LT 7.0%: CPT | Performed by: INTERNAL MEDICINE

## 2022-06-15 PROCEDURE — 80061 LIPID PANEL: CPT

## 2022-06-15 PROCEDURE — 3008F BODY MASS INDEX DOCD: CPT | Performed by: INTERNAL MEDICINE

## 2022-06-15 PROCEDURE — 99214 OFFICE O/P EST MOD 30 MIN: CPT | Performed by: INTERNAL MEDICINE

## 2022-06-15 PROCEDURE — 84550 ASSAY OF BLOOD/URIC ACID: CPT

## 2022-06-15 RX ORDER — HYDROCODONE BITARTRATE AND ACETAMINOPHEN 5; 325 MG/1; MG/1
1 TABLET ORAL EVERY 4 HOURS PRN
Qty: 30 TABLET | Refills: 0 | Status: SHIPPED | OUTPATIENT
Start: 2022-06-15

## 2022-06-15 RX ORDER — METHYLPREDNISOLONE 4 MG/1
TABLET ORAL
Qty: 1 EACH | Refills: 0 | Status: SHIPPED | OUTPATIENT
Start: 2022-06-15

## 2022-06-16 ENCOUNTER — TELEPHONE (OUTPATIENT)
Dept: INTERNAL MEDICINE CLINIC | Facility: CLINIC | Age: 57
End: 2022-06-16

## 2022-06-16 DIAGNOSIS — M10.9 ACUTE GOUT, UNSPECIFIED CAUSE, UNSPECIFIED SITE: ICD-10-CM

## 2022-06-16 DIAGNOSIS — E78.5 HYPERLIPIDEMIA, UNSPECIFIED HYPERLIPIDEMIA TYPE: Primary | ICD-10-CM

## 2022-06-16 DIAGNOSIS — E13.9 DIABETES 1.5, MANAGED AS TYPE 2 (HCC): ICD-10-CM

## 2022-06-16 DIAGNOSIS — I10 HYPERTENSION, UNSPECIFIED TYPE: ICD-10-CM

## 2022-06-16 PROBLEM — M54.12 CERVICAL NEUROPATHIC PAIN: Status: ACTIVE | Noted: 2022-06-16

## 2022-06-16 NOTE — TELEPHONE ENCOUNTER
Please tell Nickie Lowry diabetic management looks good with A1c of 6.6. LDL cholesterol is a little high at 130, should be 100 or less for a diabetic and this will likely worsen since she is off a atorvastatin. Her uric acid level has risen from 6.0-7.6. Is she taking her allopurinol faithfully? I think she should resume her a atorvastatin and glimepiride as these are very unlikely to interfere with her blood pressure. Please order CBC, CMP, lipids, A1c, uric acid, TSH to be done before her next visit. Any improvement in her neck with current medication?

## 2022-06-27 NOTE — TELEPHONE ENCOUNTER
Called and spoke with patient. Relayed MD's message. Patient stated she was not taking the Allopurinol as prescribed, but she will now. She is also agreeable to restart Atorvastatin. She does not want to restart Glimepiride at the same time since she is not sure which of the two was raising her blood pressure. She will start back on Atorvastatin first for 2-3 weeks and if that goes well, add the Glimepiride back in. Patient states her neck pain has improved with the new medication. Labs ordered, patient understands to complete them before her next visit in 3-4 months.      FYI to Dr. Britton Hidden--

## 2022-08-12 LAB — AMB EXT COVID-19 RESULT: DETECTED

## 2022-08-15 ENCOUNTER — TELEPHONE (OUTPATIENT)
Dept: INTERNAL MEDICINE CLINIC | Facility: CLINIC | Age: 57
End: 2022-08-15

## 2022-08-15 NOTE — TELEPHONE ENCOUNTER
Ray Wang  I begin to have Covid symptoms last week on Tuesday. I took at home test and confirmed I had Covid19. I am impacted with all symptoms including loss of appetite and I guess because I am high risk I am experiencing complications. My blood pressure was severely out of control and continues to spike. It was as high as 192/123. I am just beginning to eat and hold food in and I am weak. I need to take this week for sure off (and I took Wednesday, Thursday and Friday off last week). I am not sure how I will feel after this week or if I will require time to rest to gain strength   Can you please provide a notice for my job that I am off due to Covid? I can call to the office once you guys are open if you need more details  My email address is [email or web address]. Please also copy my personal email [email or web address].  Thank you and we will talk soon

## 2022-08-15 NOTE — TELEPHONE ENCOUNTER
Spoke with patient. Relayed MD message. Pt verbalized understanding. Paxlovid eRx'd to pt's preferred pharmacy. Note provided to pt via [employer].

## 2022-08-15 NOTE — TELEPHONE ENCOUNTER
----- Message from [facility] sent at 8/15/2022  2:21 AM CDT -----  Regarding: COVID test positive  Hi Dr. Santo Cadet  I begin to have Covid symptoms last week on Tuesday. I took at home test and confirmed I had Covid19. I am impacted with all symptoms including loss of appetite and I guess because I am high risk I am experiencing complications. My blood pressure was severely out of control and continues to spike. It was as high as 192/123. I am just beginning to eat and hold food in and I am weak. I need to take this week for sure off (and I took Wednesday, Thursday and Friday off last week). I am not sure how I will feel after this week or if I will require time to rest to gain strength   Can you please provide a notice for my job that I am off due to Covid? I can call to the office once you guys are open if you need more details  My email address is [email or web address]. Please also copy my personal email [email or web address].  Thank you and we will talk soon

## 2022-08-15 NOTE — TELEPHONE ENCOUNTER
Send in Paxlovid to her pharmacy.  Call us if not improving by end of week     Not for work is fine - thank you

## 2022-09-26 NOTE — TELEPHONE ENCOUNTER
COVID triage:    Start of symptoms: Tues 08/09, tested + 08/12    Fever:  []  No fever  [x]  Temperature: Tmax 101F, most recent 100.3F   [x]  Chills  [x]  Night sweats    Cough:  [x] Productive cough   [] Cough with exertion  [x] Dry cough    Breathing:  [] Wheezing  [x] Pain with deep breathing (now resolved)   [x] SOB with exertion (when walking to bathroom, dizziness with exertion). [] SOB at rest  [x] Heavy breathing  [] Chest discomfort with deep breathing or coughing    GI Symptoms: blood sugars ok, most recent 101   [x] Diarrhea  [x] Nausea  [x] Vomiting  [x] Abdominal pain  [x] Lack of appetite    Other symptoms:  [x] Sore throat  [] Difficulty swallowing  [x] Nasal drainage  [x] Nasal congestion  [x] PND  [x] Sinus pressure  [] Chest congestion  [] Head congestion  [x] Facial pain   [x] Ear pain, right ear pain  [x] Body aches  [x] Loss of sense of smell   [x] Loss of sense of taste  [x]Conjunctivitis  [x] Headache  [x] Fatigue  [x] Weakness    []OTC Medications:  NONE      Vaccinated: Yes  [x]   No []  Booster:  Yes  []  No  X    To Dr. Rommel Billingsley:  Pt not taking any OTCs for fever/symptoms. Vaccinated, no boosters. Most recent BP: 170/103 (has not taken hydralazine or labetalol yet today). Letter pended in communications tab--  \"To Whom It May Concern:    Catarino Rowland is currently under my medical care and may not return to work at this time. Please excuse Kyree Lay from work from 08/10/2022-08/19/2022. She may return to work on 08/22/2022. Activity is restricted as follows: none. If you require additional information please contact our office. Sincerely,    Mendez Gottron. MD Renzo\"      Patient was notified that this message will be routed to the physician to determine what their recommendation (s) would be. In the meantime, if they develop new or worsening symptoms, they were advised to call back or seek emergent evaluation at the ER.    Discussed the following quarantine guidelines and instructions: According to CDC guidelines,  If symptomatic: you should stay home and quarantine for 5 full days. Day 1 is the first full day after your symptoms developed (24 hrs later). Wear a well-fitted mask if you must be around others in your home. End isolation after completing 5 full days, fever-free for 24 hours (without the use of fever-reducing medication), and your symptoms are improving. If you were severely ill with COVID-19 you should isolate for at least 10 days. Consult your doctor before ending isolation. Wear a well-fitted mask for 10 full days any time you are around others inside your home or in public. Do not go to places where you are unable to wear a mask. Avoid being around people who are at high risk. Do not travel until a full 10 days after your symptoms started. Monitor your symptoms at home and call the office with any new or worsening symptoms. ER is recommended should you develop shortness of breath, chest pain, high fever that's non-responsive to fever reducing medicine, or spo2 (oxygen level) <90% (if pulse ox is accesible). Podiatry HPI: Pt is a 75M who presented to ED from his nursing home ([facility]) after his right foot started having gangrene changes. Pt states he has a vacular doctor appointment coming up soon but hasn't seen a vascular doctor as of yet.     Patient is a 75y old  Male who presents with a chief complaint of     HPI:      Podiatry HPI    PMH:  Allergies: No Known Allergies    Medications:   FH:  PSX:   SH:     Vital Signs Last 24 Hrs  T(C): 36.7 (26 Sep 2022 11:59), Max: 36.7 (26 Sep 2022 11:59)  T(F): 98 (26 Sep 2022 11:59), Max: 98 (26 Sep 2022 11:59)  HR: 80 (26 Sep 2022 11:59) (80 - 80)  BP: 138/72 (26 Sep 2022 11:59) (138/72 - 138/72)  BP(mean): --  RR: 16 (26 Sep 2022 11:59) (16 - 16)  SpO2: 100% (26 Sep 2022 11:59) (100% - 100%)    Parameters below as of 26 Sep 2022 11:59  Patient On (Oxygen Delivery Method): room air        LABS                        12.5   6.76  )-----------( 245      ( 26 Sep 2022 14:30 )             39.1               09-26    x   |  x   |  x   ----------------------------<  x   x    |  x   |  5.73<H>      TPro  7.1  /  Alb  x   /  TBili  0.2  /  DBili  x   /  AST  22  /  ALT  16  /  AlkPhos  61  09-26      ROS  REVIEW OF SYSTEMS:    CONSTITUTIONAL: No fever, weight loss, or fatigue  EYES: No eye pain, visual disturbances, or discharge  ENMT:  No difficulty hearing, tinnitus, vertigo; No sinus or throat pain  NECK: No pain or stiffness  BREASTS: No pain, masses, or nipple discharge  RESPIRATORY: No cough, wheezing, chills or hemoptysis; No shortness of breath  CARDIOVASCULAR: No chest pain, palpitations, dizziness, or leg swelling  GASTROINTESTINAL: No abdominal or epigastric pain. No nausea, vomiting, or hematemesis; No diarrhea or constipation. No melena or hematochezia.  GENITOURINARY: No dysuria, frequency, hematuria, or incontinence  NEUROLOGICAL: No headaches, memory loss, loss of strength, numbness, or tremors  SKIN: No itching, burning, rashes, or lesions   LYMPH NODES: No enlarged glands  ENDOCRINE: No heat or cold intolerance; No hair loss  MUSCULOSKELETAL: No joint pain or swelling; No muscle, back, or extremity pain  PSYCHIATRIC: No depression, anxiety, mood swings, or difficulty sleeping  HEME/LYMPH: No easy bruising, or bleeding gums  ALLERY AND IMMUNOLOGIC: No hives or eczema      PHYSICAL EXAM  LE Focused:    Vasc:    Derm:   Neuro:   MSK:      IMAGING: ?xray    CULTURES:     A:      P:   Patient evaluated and Chart reviewed   Discussed diagnosis and treatment with patient  Wound flushed with normal saline  Obtained wound culture to be sent to Lab  Excisional debridement with 15 blade of Right/Left ---- down to and including subcutaneous tissue  Applied --- with dry sterile dressing  X-rays evaluated, shows -----, results as noted above  Continue with IV antibiotics As Per ID  Ordered JESUS/PVR  Weight bearing/Non-weight bearing  to ------------  Offloading to bilateral Heels.   Discussed importance of daily foot examinations and proper shoe gear and to importance of lower Fasting Blood Glucose levels.   Podiatry to follow while in house.  Discussed with Attending ------   Podiatry HPI: Pt is a 75M who presented to ED from his nursing home ([facility]) after his right foot started having gangrene changes. Pt states he has a vacular doctor appointment coming up soon but hasn't seen a vascular doctor as of yet. Admits he has 10/10 pain to heel and digits. Admits he is normally able to walk however; since he got the right heel wound 2-3 months ago, he has been bedbound. Pt denies constitutional symptoms. Pt denies any recent acute trauma. Pt denies further pedal complaints.     Patient is a 75y old  Male who presents with a chief complaint of     HPI:      Podiatry HPI    PMH:  Allergies: No Known Allergies    Medications:   FH:  PSX:   SH:     Vital Signs Last 24 Hrs  T(C): 36.7 (26 Sep 2022 11:59), Max: 36.7 (26 Sep 2022 11:59)  T(F): 98 (26 Sep 2022 11:59), Max: 98 (26 Sep 2022 11:59)  HR: 80 (26 Sep 2022 11:59) (80 - 80)  BP: 138/72 (26 Sep 2022 11:59) (138/72 - 138/72)  BP(mean): --  RR: 16 (26 Sep 2022 11:59) (16 - 16)  SpO2: 100% (26 Sep 2022 11:59) (100% - 100%)    Parameters below as of 26 Sep 2022 11:59  Patient On (Oxygen Delivery Method): room air        LABS                        12.5   6.76  )-----------( 245      ( 26 Sep 2022 14:30 )             39.1               09-26    x   |  x   |  x   ----------------------------<  x   x    |  x   |  5.73<H>      TPro  7.1  /  Alb  x   /  TBili  0.2  /  DBili  x   /  AST  22  /  ALT  16  /  AlkPhos  61  09-26      ROS  REVIEW OF SYSTEMS:    CONSTITUTIONAL: No fever, weight loss, or fatigue  EYES: No eye pain, visual disturbances, or discharge  ENMT:  No difficulty hearing, tinnitus, vertigo; No sinus or throat pain  NECK: No pain or stiffness  BREASTS: No pain, masses, or nipple discharge  RESPIRATORY: No cough, wheezing, chills or hemoptysis; No shortness of breath  CARDIOVASCULAR: No chest pain, palpitations, dizziness, or leg swelling  GASTROINTESTINAL: No abdominal or epigastric pain. No nausea, vomiting, or hematemesis; No diarrhea or constipation. No melena or hematochezia.  GENITOURINARY: No dysuria, frequency, hematuria, or incontinence  NEUROLOGICAL: No headaches, memory loss, loss of strength, numbness, or tremors  SKIN: No itching, burning, rashes, or lesions   LYMPH NODES: No enlarged glands  ENDOCRINE: No heat or cold intolerance; No hair loss  MUSCULOSKELETAL: No joint pain or swelling; No muscle, back, or extremity pain  PSYCHIATRIC: No depression, anxiety, mood swings, or difficulty sleeping  HEME/LYMPH: No easy bruising, or bleeding gums  ALLERY AND IMMUNOLOGIC: No hives or eczema      PHYSICAL EXAM  LE Focused: Pt is A&Ox3 in mild distress from right foot pain   Vasc: DP/PT pulses non palpable 0/4 b/l; temperature gradient is warm to cool from proximal leg to digits with R > L. No edema noted to b/l LE  Derm: Right 2nd digit dry gangrenous ischemic changes noted without erythema, drainage, edema that is rigid and is cool to the touch. Right 1st and 3rd digits at distal tuft's show dusky changes that are also cool to the touch without open lesions or signs of infection. Right plantar heel wound with eschar base and no signs of infection, no fluctuance noted. Left foot no signs of gangrene or any open lesions.   Neuro: Protective sensation present b/l; light touch sensation present b/l   MSK: POP to right digits 1-3 and plantar heel wound; muscle strength exam deferred 2/2 pain; b/l hammertoe rigid contractions of digits 2-5       IMAGING: Xray pending R. foot    CULTURES:     A:  - PVD  - DM  - ESRD on dialysis  - Right 2nd digit dry gangrene  - Right 1st and 3rd digit ischemic changes  - Right plantar heel eschar       P:   Patient evaluated and Chart reviewed   Discussed diagnosis and treatment with patient  Applied betadine, DSD to right plantar heel and right 1st, 2nd, 3rd digits   Recommend urgent vascular consult due to non-palpable pulses, dry gangrene of right 2nd digit, dusky changes to R. 1st and 3rd digits   X-rays ordered; pending  Ordered JESUS/PVR  NWB to RLE  Recommend nephrology consult due to pt's high creatinine and ESRD/dialysis history   Offloading to bilateral Heels; recommend b/l CAIR boots   Discussed importance of daily foot examinations and proper shoe gear and to importance of lower Fasting Blood Glucose levels.   Podiatry to follow while in house.  Discussed with Attending Dr. Godinez    Podiatry HPI: Pt is a 75M who presented to ED from his nursing home ([facility]) after his right foot started having gangrene changes. Pt states he has a vacular doctor appointment coming up soon but hasn't seen a vascular doctor as of yet. Admits he has 10/10 pain to heel and digits. Admits he is normally able to walk however; since he got the right heel wound 2-3 months ago, he has been bedbound. Pt denies constitutional symptoms. Pt denies any recent acute trauma. Pt denies further pedal complaints.     Patient is a 75y old  Male who presents with a chief complaint of     HPI:      Podiatry HPI    PMH:  Allergies: No Known Allergies    Medications:   FH:  PSX:   SH:     Vital Signs Last 24 Hrs  T(C): 36.7 (26 Sep 2022 11:59), Max: 36.7 (26 Sep 2022 11:59)  T(F): 98 (26 Sep 2022 11:59), Max: 98 (26 Sep 2022 11:59)  HR: 80 (26 Sep 2022 11:59) (80 - 80)  BP: 138/72 (26 Sep 2022 11:59) (138/72 - 138/72)  BP(mean): --  RR: 16 (26 Sep 2022 11:59) (16 - 16)  SpO2: 100% (26 Sep 2022 11:59) (100% - 100%)    Parameters below as of 26 Sep 2022 11:59  Patient On (Oxygen Delivery Method): room air        LABS                        12.5   6.76  )-----------( 245      ( 26 Sep 2022 14:30 )             39.1               09-26    x   |  x   |  x   ----------------------------<  x   x    |  x   |  5.73<H>      TPro  7.1  /  Alb  x   /  TBili  0.2  /  DBili  x   /  AST  22  /  ALT  16  /  AlkPhos  61  09-26      ROS  REVIEW OF SYSTEMS:    CONSTITUTIONAL: No fever, weight loss, or fatigue  EYES: No eye pain, visual disturbances, or discharge  ENMT:  No difficulty hearing, tinnitus, vertigo; No sinus or throat pain  NECK: No pain or stiffness  BREASTS: No pain, masses, or nipple discharge  RESPIRATORY: No cough, wheezing, chills or hemoptysis; No shortness of breath  CARDIOVASCULAR: No chest pain, palpitations, dizziness, or leg swelling  GASTROINTESTINAL: No abdominal or epigastric pain. No nausea, vomiting, or hematemesis; No diarrhea or constipation. No melena or hematochezia.  GENITOURINARY: No dysuria, frequency, hematuria, or incontinence  NEUROLOGICAL: No headaches, memory loss, loss of strength, numbness, or tremors  SKIN: No itching, burning, rashes, or lesions   LYMPH NODES: No enlarged glands  ENDOCRINE: No heat or cold intolerance; No hair loss  MUSCULOSKELETAL: No joint pain or swelling; No muscle, back, or extremity pain  PSYCHIATRIC: No depression, anxiety, mood swings, or difficulty sleeping  HEME/LYMPH: No easy bruising, or bleeding gums  ALLERY AND IMMUNOLOGIC: No hives or eczema      PHYSICAL EXAM  LE Focused: Pt is A&Ox3 in mild distress from right foot pain   Vasc: DP/PT pulses non palpable 0/4 b/l; temperature gradient is warm to cool from proximal leg to digits with R > L. No edema noted to b/l LE  Derm: Right 2nd digit dry gangrenous ischemic changes noted without erythema, drainage, edema that is rigid and is cool to the touch. Right 1st and 3rd digits at distal tuft's show dusky changes that are also cool to the touch without open lesions or signs of infection. Right plantar heel wound with eschar base and no signs of infection, no fluctuance noted. Left foot no signs of gangrene or any open lesions.   Neuro: Protective sensation present b/l; light touch sensation present b/l   MSK: POP to right digits 1-3 and plantar heel wound; muscle strength exam deferred 2/2 pain; b/l hammertoe rigid contractions of digits 2-5       IMAGING: Xray pending R. foot    CULTURES:     A:  - PVD  - DM  - ESRD on dialysis  - Right 2nd digit dry gangrene  - Right 1st and 3rd digit ischemic changes  - Right plantar heel eschar       P:   Patient evaluated and Chart reviewed   Discussed diagnosis and treatment with patient  Applied betadine, DSD to right plantar heel and right 1st, 2nd, 3rd digits   Recommend urgent vascular consult due to non-palpable pulses, dry gangrene of right 2nd digit, dusky changes to R. 1st and 3rd digits   Recommend R. MRI to r/o OM  X-rays ordered; pending  Ordered JESUS/PVR  NWB to RLE  Recommend nephrology consult due to pt's high creatinine and ESRD/dialysis history   Offloading to bilateral Heels; recommend b/l CAIR boots   Discussed importance of daily foot examinations and proper shoe gear and to importance of lower Fasting Blood Glucose levels.   Podiatry to follow while in house.  Discussed with Attending Dr. Godinez

## 2022-10-20 RX ORDER — LABETALOL 100 MG/1
TABLET, FILM COATED ORAL
Qty: 270 TABLET | Refills: 3 | Status: SHIPPED | OUTPATIENT
Start: 2022-10-20

## 2022-11-29 NOTE — TELEPHONE ENCOUNTER
Refer to 6/16/22 TE. Patient had been off Glimepiride and Atorvastatin. She agreed to re-start Atorvastatin first, then add Glimepiride 2-3 weeks later. She was supposed to f/u in office in Sept/Oct with repeat labs (not done). Left message to call back. FD: when patient returns call, assist with scheduling then transfer to nursing.

## 2023-01-02 RX ORDER — BLOOD SUGAR DIAGNOSTIC
STRIP MISCELLANEOUS
Qty: 200 STRIP | Refills: 5 | Status: SHIPPED | OUTPATIENT
Start: 2023-01-02

## 2023-02-17 NOTE — TELEPHONE ENCOUNTER
To Keyon Mckay for consult, glimepiride stopped June 2022 by Dr. Eve Grubbs but please see TE 6/16/22, seems pt was to add this back in?      I did send mychart to confirm she's taking it

## 2023-02-20 RX ORDER — GLIMEPIRIDE 1 MG/1
TABLET ORAL
Qty: 45 TABLET | Refills: 3 | Status: SHIPPED | OUTPATIENT
Start: 2023-02-20

## 2023-02-21 ENCOUNTER — PATIENT MESSAGE (OUTPATIENT)
Dept: INTERNAL MEDICINE CLINIC | Facility: CLINIC | Age: 58
End: 2023-02-21

## 2023-02-22 ENCOUNTER — TELEPHONE (OUTPATIENT)
Dept: INTERNAL MEDICINE CLINIC | Facility: CLINIC | Age: 58
End: 2023-02-22

## 2023-02-22 RX ORDER — GLIMEPIRIDE 1 MG/1
TABLET ORAL
Qty: 45 TABLET | Refills: 3 | OUTPATIENT
Start: 2023-02-22

## 2023-02-22 NOTE — TELEPHONE ENCOUNTER
Please advise for SHILPA PARKER patient - called patient who states she is on Labetalol 100mg and Hydralazine 25mg BID- she is taking it regularly as prescribed. Recently her BP is spiking to 190/100. Patient can feel when it goes up - she is getting dizzy. She is recording her BP - at 2 am it was high, yesterday  It was 112/74 lowest reading which happen like every 3-4 days otherwise her BP is around 537 and diastolic 145  Or in middle 90  , also severe  hairloss . Patient had Covid in August 2022 - to DR. Julia Mackenzie

## 2023-02-22 NOTE — TELEPHONE ENCOUNTER
Increase labetalol to 200mg BID  Follow up with Dr. Shabana Taylor in a week or so    Fyi to Dr. Shabana Taylor also

## 2023-02-25 NOTE — TELEPHONE ENCOUNTER
Please call her, how is her BP doing? ? Does she need higher dose of labetalol sent into her pharmacy?

## 2023-02-27 NOTE — TELEPHONE ENCOUNTER
Relayed MD message and instructions below. She feels that the labetalol is not working, so Anibal Velarde would I increase that\". She feels that another medication should be tried instead of labetalol. Advised that Dr. Lianna Vivar initially recommended Amlodipine and she declined. Pt states she does not like taking a lot of pills. Advised that we have offered multiple options for BP management. Pt states \"i'm just going to gradually come off of the labetalol\". RN advised against this. Unable to offer virtual visit, as pt is out of state. Dr. Lianna Vivar, please advise if you are able to provide any additional recommendations or if you are able to call patient.

## 2023-02-27 NOTE — TELEPHONE ENCOUNTER
Another option would be to increase labetalol from 200 mg bid to 200 mg tid- can send in new Rx for labetalol 200mg, # 270, 1 tid. If this does not lower BP no choice but to add another medication.

## 2023-02-27 NOTE — TELEPHONE ENCOUNTER
To Dr. Meena Hale to please advise---    Spoke with pt, reports BP has still been elevated, but maybe slightly improved. Updated BP readings listed below:    2/26/23:   4AM 161/94  10AM 163/104  10:30AM 151/100  3PM 122/74  8PM 135/88    Today 2/27/23:   12AM 146/78  9AM 152/82  10:15AM 171/102    Pt is taking labetalol 200mg BID and reports she increased her hydralazine from 25mg BID to 25mg TID. Reports she is still not feeling well. Still having occasional dizziness and reports intermittent SOB when going up and down stairs. Also having headaches which is abnormal for her, but they are not severe. States she had been having \"a heaviness\" in the chest but that has resolved since increasing labetalol dose. Also feels her vision is worsening but this has been gradual. Denies any acute or sudden vision changes. Pt inquiring if ongoing hair loss could be from the labetalol? Pt is in [facility] currently taking care of her daughter. She was planning to come back in April but can come back sooner if OV is needed. The soonest she could come back is possibly next week. Pt does not need refill of labetalol yet, she will use up her current supply. Notified patient that we will route this message to the doctor and see what their recommendations would be. In the meantime, if anything worsens, they were advised to call back, or seek emergent evaluation with any chest pain, SOB, severe headache, vision changes, arm/neck pain, dizziness, etc. Pt verbalized understanding.

## 2023-02-27 NOTE — TELEPHONE ENCOUNTER
Add amlodipine 5 mg daily, #90, 1 daily refill x3. She was on this in 2018, but she remember having any issues with this at the time. This should help a lot. Do not know if her hair loss is due to labetalol but could be due to any drug. I think she should go to a local urgent care for evaluation. I really do not want her driving all this distance with her blood pressure elevated and the fact that she is not feeling well. I am happy to see her anytime but I want her feeling reasonably well to drive up here. At some point, it would make sense for her to establish with a doctor down by her that she can easily see and he can respond to her when she needs it.

## 2023-02-27 NOTE — TELEPHONE ENCOUNTER
I spoke with patient and relayed Dr. Mandeep Mitchell message. She verbalized understanding. She is hesitant to start another medication. She says that the office should know why she was taken off of this medication as she does not recall. She says she feels this is too much more medication and \"why are we just adding adding adding\" medication? Explained that I would hold off on sending the medication in and relay her concerns to Dr. Alita Schaumann. She verbalized understanding. Patient says that she does not know of any local urgent cares or immediate cares near her in [facility]. She will look into this. Explained that I would relay this to Dr. Alita Schaumann and call her back with further recommendations. Dr. Alita Schaumann, please review the office note from 3/23/18.

## 2023-03-23 RX ORDER — ATORVASTATIN CALCIUM 10 MG/1
TABLET, FILM COATED ORAL
Qty: 90 TABLET | Refills: 0 | Status: SHIPPED | OUTPATIENT
Start: 2023-03-23

## 2023-04-19 ENCOUNTER — PATIENT MESSAGE (OUTPATIENT)
Dept: INTERNAL MEDICINE CLINIC | Facility: CLINIC | Age: 58
End: 2023-04-19

## 2023-04-19 ENCOUNTER — TELEPHONE (OUTPATIENT)
Dept: INTERNAL MEDICINE CLINIC | Facility: CLINIC | Age: 58
End: 2023-04-19

## 2023-04-19 NOTE — TELEPHONE ENCOUNTER
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

## 2023-04-25 NOTE — TELEPHONE ENCOUNTER
From: Mich Byrne  To: Guillermo Ponce.  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

## 2023-04-28 NOTE — TELEPHONE ENCOUNTER
Called patient and relayed DR. MASSEY message - verbalized understanding . RX  Sent lab orders entered for 3 months per DR. MASSEY order
Tell her sugar 145 and A1C up from 6.5 to 6.9 - goal is 6.0. Should start metformin 500 mg once daily, # 30 or 90 per insurance, refill prn. Also, LDL chol 136 - goal is < 100 in a diabetic. Start Lipitor 10 mg daily, #30 or 90, refill prn.     Repeat
95

## 2023-05-04 RX ORDER — HYDRALAZINE HYDROCHLORIDE 25 MG/1
TABLET, FILM COATED ORAL
Qty: 180 TABLET | Refills: 3 | Status: SHIPPED | OUTPATIENT
Start: 2023-05-04

## 2023-05-18 ENCOUNTER — PATIENT MESSAGE (OUTPATIENT)
Dept: INTERNAL MEDICINE CLINIC | Facility: CLINIC | Age: 58
End: 2023-05-18

## 2023-05-19 ENCOUNTER — TELEPHONE (OUTPATIENT)
Dept: INTERNAL MEDICINE CLINIC | Facility: CLINIC | Age: 58
End: 2023-05-19

## 2023-05-19 RX ORDER — HYDROCODONE BITARTRATE AND ACETAMINOPHEN 5; 325 MG/1; MG/1
1 TABLET ORAL EVERY 8 HOURS PRN
Qty: 30 TABLET | Refills: 0 | Status: SHIPPED | OUTPATIENT
Start: 2023-05-19

## 2023-05-19 RX ORDER — INDOMETHACIN 50 MG/1
50 CAPSULE ORAL 3 TIMES DAILY PRN
Qty: 30 CAPSULE | Refills: 0 | Status: SHIPPED | OUTPATIENT
Start: 2023-05-19

## 2023-05-19 NOTE — TELEPHONE ENCOUNTER
Spoke with patient. Relayed MD message. Pt verbalized understanding. She will call back to schedule annual physical.     Pt requesting norco refill. To Dr. Mahajan Angles:   To MD:  The above refill request is for a controlled substance. Please review pended medication order. Print and sign for staff to fax to pharmacy or prescribe electronically.     Last office visit: 6/15/22  Last time refill sent and quantity/refills:  No data per [place]   Last ordered 6-15-22, #30/0

## 2023-05-19 NOTE — TELEPHONE ENCOUNTER
I sent in indomethacin 50mg every 8 hours with food  She should see if this helps;otherwise needs to come to the office.  She is also due for a physicla and blood work-please schedule

## 2023-05-19 NOTE — TELEPHONE ENCOUNTER
To Dr. Isabela Echols:  Please advise in absence of Dr. Shabana Taylor. Pt reporting gout flare up that started 8 days ago -- started Medrol dose pack that Dr. Shabana Taylor orders prn for flares. Gout attack in left ankle -- +redness, +swelling, +warm to touch, +tender to touch. Symptoms improved after starting Medrol dose pack; finished therapy yesterday and symptoms returned this morning. Not taking any other OTCs. Taking Allopurinol 300 mg every day. Pt's states in the past she has taken Indomethacin (Indomethacin 50 mg TID prn gout). Offered office visit -- pt declined, she is in [facility]. Patient was notified that this message will be routed to the physician to determine what their recommendation (s) would be. In the meantime, if they develop new or worsening symptoms, they were advised to call back or seek emergent evaluation at the ER.      [facility] [address] [place] [address], [address] [place] [address] Drive AT [address] [address], [phone], [phone]

## 2023-08-11 ENCOUNTER — TELEPHONE (OUTPATIENT)
Dept: INTERNAL MEDICINE CLINIC | Facility: CLINIC | Age: 58
End: 2023-08-11

## 2023-08-11 DIAGNOSIS — I10 HYPERTENSION, UNSPECIFIED TYPE: ICD-10-CM

## 2023-08-11 DIAGNOSIS — E78.00 HYPERCHOLESTEREMIA: ICD-10-CM

## 2023-08-11 DIAGNOSIS — Z12.31 ENCOUNTER FOR SCREENING MAMMOGRAM FOR MALIGNANT NEOPLASM OF BREAST: ICD-10-CM

## 2023-08-11 DIAGNOSIS — E13.9 DIABETES 1.5, MANAGED AS TYPE 2 (HCC): ICD-10-CM

## 2023-08-11 DIAGNOSIS — R92.2 DENSE BREAST: ICD-10-CM

## 2023-08-11 DIAGNOSIS — Z00.00 ANNUAL PHYSICAL EXAM: Primary | ICD-10-CM

## 2023-08-11 NOTE — TELEPHONE ENCOUNTER
Labs ordered per MD request. Mammogram ordered per protocol. Spoke to pt and relayed MD message and instructions. Pt verbalized understanding and agrees with plan. TO FD: please add pt to schedule on Tuesday at 5 PM. I was unable to due to not having \"override access. \"     Pt is aware her appt is on 8/15 at 5:00 PM.

## 2023-08-11 NOTE — TELEPHONE ENCOUNTER
Patient called on Friday, 8/11/23  She is in town from [facility] until Tuesday  She was hoping to be seen by Dr Scott Laureano on Tuesday if he could add her in so she could update him about her health    Patient requesting orders for labs and mammogram  She is hoping to do those while she is in town     Please call patient when orders in place    Please call patient regarding appt  Patient understood Dr Scott Laureano out of the office until Tuesday    Tasked to Dr Muller/nursing -

## 2023-08-12 ENCOUNTER — LAB ENCOUNTER (OUTPATIENT)
Dept: LAB | Facility: HOSPITAL | Age: 58
End: 2023-08-12
Attending: INTERNAL MEDICINE
Payer: COMMERCIAL

## 2023-08-12 DIAGNOSIS — E78.00 HYPERCHOLESTEREMIA: ICD-10-CM

## 2023-08-12 DIAGNOSIS — E13.9 DIABETES 1.5, MANAGED AS TYPE 2 (HCC): ICD-10-CM

## 2023-08-12 DIAGNOSIS — I10 HYPERTENSION, UNSPECIFIED TYPE: ICD-10-CM

## 2023-08-12 DIAGNOSIS — Z00.00 ANNUAL PHYSICAL EXAM: ICD-10-CM

## 2023-08-12 LAB
ALBUMIN SERPL-MCNC: 3.4 G/DL (ref 3.4–5)
ALBUMIN/GLOB SERPL: 0.9 {RATIO} (ref 1–2)
ALP LIVER SERPL-CCNC: 77 U/L
ALT SERPL-CCNC: 23 U/L
ANION GAP SERPL CALC-SCNC: 7 MMOL/L (ref 0–18)
AST SERPL-CCNC: 16 U/L (ref 15–37)
BASOPHILS # BLD AUTO: 0.04 X10(3) UL (ref 0–0.2)
BASOPHILS NFR BLD AUTO: 0.6 %
BILIRUB SERPL-MCNC: 0.5 MG/DL (ref 0.1–2)
BUN BLD-MCNC: 10 MG/DL (ref 7–18)
BUN/CREAT SERPL: 9.4 (ref 10–20)
CALCIUM BLD-MCNC: 9 MG/DL (ref 8.5–10.1)
CHLORIDE SERPL-SCNC: 110 MMOL/L (ref 98–112)
CHOLEST SERPL-MCNC: 147 MG/DL (ref ?–200)
CO2 SERPL-SCNC: 27 MMOL/L (ref 21–32)
CREAT BLD-MCNC: 1.06 MG/DL
DEPRECATED RDW RBC AUTO: 44.8 FL (ref 35.1–46.3)
EGFRCR SERPLBLD CKD-EPI 2021: 62 ML/MIN/1.73M2 (ref 60–?)
EOSINOPHIL # BLD AUTO: 0.09 X10(3) UL (ref 0–0.7)
EOSINOPHIL NFR BLD AUTO: 1.3 %
ERYTHROCYTE [DISTWIDTH] IN BLOOD BY AUTOMATED COUNT: 14.9 % (ref 11–15)
EST. AVERAGE GLUCOSE BLD GHB EST-MCNC: 146 MG/DL (ref 68–126)
FASTING PATIENT LIPID ANSWER: YES
FASTING STATUS PATIENT QL REPORTED: YES
GLOBULIN PLAS-MCNC: 4 G/DL (ref 2.8–4.4)
GLUCOSE BLD-MCNC: 131 MG/DL (ref 70–99)
HBA1C MFR BLD: 6.7 % (ref ?–5.7)
HCT VFR BLD AUTO: 41.5 %
HDLC SERPL-MCNC: 43 MG/DL (ref 40–59)
HGB BLD-MCNC: 13.1 G/DL
IMM GRANULOCYTES # BLD AUTO: 0.02 X10(3) UL (ref 0–1)
IMM GRANULOCYTES NFR BLD: 0.3 %
LDLC SERPL CALC-MCNC: 86 MG/DL (ref ?–100)
LYMPHOCYTES # BLD AUTO: 2.3 X10(3) UL (ref 1–4)
LYMPHOCYTES NFR BLD AUTO: 33.7 %
MCH RBC QN AUTO: 25.9 PG (ref 26–34)
MCHC RBC AUTO-ENTMCNC: 31.6 G/DL (ref 31–37)
MCV RBC AUTO: 82 FL
MONOCYTES # BLD AUTO: 0.59 X10(3) UL (ref 0.1–1)
MONOCYTES NFR BLD AUTO: 8.6 %
NEUTROPHILS # BLD AUTO: 3.79 X10 (3) UL (ref 1.5–7.7)
NEUTROPHILS # BLD AUTO: 3.79 X10(3) UL (ref 1.5–7.7)
NEUTROPHILS NFR BLD AUTO: 55.5 %
NONHDLC SERPL-MCNC: 104 MG/DL (ref ?–130)
OSMOLALITY SERPL CALC.SUM OF ELEC: 299 MOSM/KG (ref 275–295)
PLATELET # BLD AUTO: 229 10(3)UL (ref 150–450)
POTASSIUM SERPL-SCNC: 3.9 MMOL/L (ref 3.5–5.1)
PROT SERPL-MCNC: 7.4 G/DL (ref 6.4–8.2)
RBC # BLD AUTO: 5.06 X10(6)UL
SODIUM SERPL-SCNC: 144 MMOL/L (ref 136–145)
TRIGL SERPL-MCNC: 97 MG/DL (ref 30–149)
TSI SER-ACNC: 2.32 MIU/ML (ref 0.36–3.74)
VLDLC SERPL CALC-MCNC: 15 MG/DL (ref 0–30)
WBC # BLD AUTO: 6.8 X10(3) UL (ref 4–11)

## 2023-08-12 PROCEDURE — 80061 LIPID PANEL: CPT

## 2023-08-12 PROCEDURE — 36415 COLL VENOUS BLD VENIPUNCTURE: CPT

## 2023-08-12 PROCEDURE — 80053 COMPREHEN METABOLIC PANEL: CPT

## 2023-08-12 PROCEDURE — 84443 ASSAY THYROID STIM HORMONE: CPT

## 2023-08-12 PROCEDURE — 83036 HEMOGLOBIN GLYCOSYLATED A1C: CPT

## 2023-08-12 PROCEDURE — 3044F HG A1C LEVEL LT 7.0%: CPT | Performed by: INTERNAL MEDICINE

## 2023-08-12 PROCEDURE — 85025 COMPLETE CBC W/AUTO DIFF WBC: CPT

## 2023-08-14 ENCOUNTER — HOSPITAL ENCOUNTER (OUTPATIENT)
Dept: MAMMOGRAPHY | Age: 58
Discharge: HOME | End: 2023-08-14
Attending: INTERNAL MEDICINE
Payer: COMMERCIAL

## 2023-08-14 DIAGNOSIS — Z12.31 ENCOUNTER FOR SCREENING MAMMOGRAM FOR MALIGNANT NEOPLASM OF BREAST: ICD-10-CM

## 2023-08-14 DIAGNOSIS — R92.2 DENSE BREAST: ICD-10-CM

## 2023-08-14 PROCEDURE — 77067 SCR MAMMO BI INCL CAD: CPT | Performed by: INTERNAL MEDICINE

## 2023-08-14 PROCEDURE — 77063 BREAST TOMOSYNTHESIS BI: CPT | Performed by: INTERNAL MEDICINE

## 2023-08-15 ENCOUNTER — OFFICE VISIT (OUTPATIENT)
Dept: INTERNAL MEDICINE CLINIC | Facility: CLINIC | Age: 58
End: 2023-08-15

## 2023-08-15 VITALS
WEIGHT: 248 LBS | TEMPERATURE: 98 F | HEART RATE: 62 BPM | HEIGHT: 62 IN | DIASTOLIC BLOOD PRESSURE: 86 MMHG | BODY MASS INDEX: 45.64 KG/M2 | OXYGEN SATURATION: 100 % | SYSTOLIC BLOOD PRESSURE: 150 MMHG

## 2023-08-15 DIAGNOSIS — Z00.00 ANNUAL PHYSICAL EXAM: Primary | ICD-10-CM

## 2023-08-15 DIAGNOSIS — I10 PRIMARY HYPERTENSION: ICD-10-CM

## 2023-08-15 DIAGNOSIS — E13.9 DIABETES 1.5, MANAGED AS TYPE 2 (HCC): ICD-10-CM

## 2023-08-15 DIAGNOSIS — E78.00 HYPERCHOLESTEREMIA: ICD-10-CM

## 2023-08-15 PROCEDURE — 3077F SYST BP >= 140 MM HG: CPT | Performed by: INTERNAL MEDICINE

## 2023-08-15 PROCEDURE — 99396 PREV VISIT EST AGE 40-64: CPT | Performed by: INTERNAL MEDICINE

## 2023-08-15 PROCEDURE — 3008F BODY MASS INDEX DOCD: CPT | Performed by: INTERNAL MEDICINE

## 2023-08-15 PROCEDURE — 3079F DIAST BP 80-89 MM HG: CPT | Performed by: INTERNAL MEDICINE

## 2023-08-15 RX ORDER — HYDROCODONE BITARTRATE AND ACETAMINOPHEN 5; 325 MG/1; MG/1
1 TABLET ORAL EVERY 4 HOURS PRN
Qty: 30 TABLET | Refills: 0 | Status: SHIPPED | OUTPATIENT
Start: 2023-08-15

## 2023-08-15 RX ORDER — HYDROCODONE BITARTRATE AND ACETAMINOPHEN 5; 325 MG/1; MG/1
1 TABLET ORAL EVERY 6 HOURS PRN
Qty: 30 TABLET | Refills: 0 | Status: SHIPPED | OUTPATIENT
Start: 2023-08-15

## 2023-08-15 RX ORDER — SEMAGLUTIDE 1.34 MG/ML
0.5 INJECTION, SOLUTION SUBCUTANEOUS WEEKLY
COMMUNITY

## 2023-12-01 NOTE — TELEPHONE ENCOUNTER
Please refill what she needs in the quantity she needs for 3-month supplies. If she takes the one antihypertensive 3 times per day give her enough for 3 times per day.   Refill for 1 year [facility] - Lab

## 2024-02-28 RX ORDER — GLIMEPIRIDE 1 MG/1
TABLET ORAL
Qty: 45 TABLET | Refills: 1 | Status: SHIPPED | OUTPATIENT
Start: 2024-02-28

## 2024-02-28 NOTE — TELEPHONE ENCOUNTER
Refill request is for a maintenance medication and has met the criteria specified in the Ambulatory Medication Refill Standing Order for eligibility, visits, laboratory, alerts and was sent to the requested pharmacy.    Requested Prescriptions     Signed Prescriptions Disp Refills    glimepiride 1 MG Oral Tab 45 tablet 1     Sig: TAKE 1/2 TABLET BY MOUTH DAILY FOR DIABETES     Authorizing Provider: BECKY MCCAULEY     Ordering User: MALINDA STARKEY

## 2024-06-17 RX ORDER — BLOOD SUGAR DIAGNOSTIC
STRIP MISCELLANEOUS
Qty: 200 STRIP | Refills: 0 | Status: SHIPPED | OUTPATIENT
Start: 2024-06-17

## 2024-07-10 ENCOUNTER — TELEPHONE (OUTPATIENT)
Dept: CASE MANAGEMENT | Age: 59
End: 2024-07-10

## 2024-07-10 NOTE — TELEPHONE ENCOUNTER
Left patient message to call office to schedule Hypertension follow-up appointment, first attempt.

## 2024-07-30 ENCOUNTER — PATIENT MESSAGE (OUTPATIENT)
Dept: INTERNAL MEDICINE CLINIC | Facility: CLINIC | Age: 59
End: 2024-07-30

## 2024-07-30 ENCOUNTER — TELEPHONE (OUTPATIENT)
Dept: INTERNAL MEDICINE CLINIC | Facility: CLINIC | Age: 59
End: 2024-07-30

## 2024-07-30 DIAGNOSIS — R52 PAIN: Primary | ICD-10-CM

## 2024-07-30 RX ORDER — INDOMETHACIN 50 MG/1
50 CAPSULE ORAL 3 TIMES DAILY PRN
Qty: 30 CAPSULE | Refills: 0 | Status: SHIPPED | OUTPATIENT
Start: 2024-07-30

## 2024-07-30 RX ORDER — INDOMETHACIN 50 MG/1
50 CAPSULE ORAL 3 TIMES DAILY PRN
Qty: 30 CAPSULE | Refills: 0 | Status: CANCELLED | OUTPATIENT
Start: 2024-07-30

## 2024-07-30 RX ORDER — METHYLPREDNISOLONE 4 MG/1
TABLET ORAL
Qty: 21 EACH | Refills: 0 | Status: CANCELLED | OUTPATIENT
Start: 2024-07-30

## 2024-07-30 RX ORDER — HYDROCODONE BITARTRATE AND ACETAMINOPHEN 5; 325 MG/1; MG/1
1 TABLET ORAL EVERY 4 HOURS PRN
Qty: 30 TABLET | Refills: 0 | Status: CANCELLED | OUTPATIENT
Start: 2024-07-30

## 2024-07-30 RX ORDER — HYDROCODONE BITARTRATE AND ACETAMINOPHEN 5; 325 MG/1; MG/1
1 TABLET ORAL EVERY 6 HOURS PRN
Qty: 30 TABLET | Refills: 0 | Status: SHIPPED | OUTPATIENT
Start: 2024-07-30

## 2024-07-30 RX ORDER — METHYLPREDNISOLONE 4 MG/1
TABLET ORAL
Qty: 21 EACH | Refills: 1 | Status: SHIPPED | OUTPATIENT
Start: 2024-07-30

## 2024-07-30 NOTE — TELEPHONE ENCOUNTER
Rx for Medrol dosepak sent as directed by , patient notified.   Patient is also requesting refill on Norco as well as Indomethacin. Prescriptions pending, to  to authorize

## 2024-07-30 NOTE — TELEPHONE ENCOUNTER
To  - called patient who has gout attack right big toe, red swollen and painful started over night  Will be using [facility] in [facility]  thanks

## 2024-07-30 NOTE — TELEPHONE ENCOUNTER
From: Db Miner  To: Jarred Muller  Sent: 7/30/2024 9:53 AM CDT  Subject: Active Gout    Hi   I would like for my records to indicated a gout attack today 7/30/2024 (overnight)  It is in my Right big toe at this time    I have had small attacks over the last 6 months but was manageable  This one I am struggling with pain and mobility  Thank you

## 2024-07-30 NOTE — TELEPHONE ENCOUNTER
----- Message from [employer]  sent at 7/30/2024  9:53 AM CDT -----  Regarding: Active Gout  Contact: [phone]  Hi   I would like for my records to indicated a gout attack today 7/30/2024 (overnight)  It is in my Right big toe at this time    I have had small attacks over the last 6 months but was manageable  This one I am struggling with pain and mobility  Thank you

## 2024-08-23 ENCOUNTER — LAB OUTSIDE AN ENCOUNTER (OUTPATIENT)
Dept: URBAN - METROPOLITAN AREA SURGERY CENTER 30 | Facility: SURGERY CENTER | Age: 59
End: 2024-08-23

## 2024-08-27 ENCOUNTER — OFFICE VISIT (OUTPATIENT)
Dept: URBAN - METROPOLITAN AREA SURGERY CENTER 30 | Facility: SURGERY CENTER | Age: 59
End: 2024-08-27

## 2024-09-10 DIAGNOSIS — R52 PAIN: ICD-10-CM

## 2024-09-10 RX ORDER — INDOMETHACIN 50 MG/1
50 CAPSULE ORAL 3 TIMES DAILY PRN
Qty: 30 CAPSULE | Refills: 0 | OUTPATIENT
Start: 2024-09-10

## 2024-11-04 RX ORDER — GLIMEPIRIDE 1 MG/1
TABLET ORAL
Qty: 45 TABLET | Refills: 1 | Status: CANCELLED | OUTPATIENT
Start: 2024-11-04

## 2025-04-23 NOTE — TELEPHONE ENCOUNTER
Former patient of Dr Muller      Current Outpatient Medications:       Glucose Blood (ONETOUCH VERIO) In Vitro Strip, USE TO TEST TWICE DAILY, Disp: 200 strip, Rfl: 0

## 2025-04-28 NOTE — TELEPHONE ENCOUNTER
Please review; protocol failed/No Protocol      Last Office Visit: 08/15/2023  Routed to call center to call patient to schedule an appointment.     Former Dr. Muller patient

## 2025-04-30 RX ORDER — LANCETS 30 GAUGE
1 EACH MISCELLANEOUS 2 TIMES DAILY
Qty: 200 EACH | Refills: 0 | Status: SHIPPED | OUTPATIENT
Start: 2025-04-30

## 2025-04-30 RX ORDER — BLOOD SUGAR DIAGNOSTIC
STRIP MISCELLANEOUS
Qty: 200 STRIP | Refills: 0 | Status: SHIPPED | OUTPATIENT
Start: 2025-04-30

## 2025-05-01 ENCOUNTER — OFFICE VISIT (OUTPATIENT)
Dept: INTERNAL MEDICINE CLINIC | Facility: CLINIC | Age: 60
End: 2025-05-01

## 2025-05-01 ENCOUNTER — LAB ENCOUNTER (OUTPATIENT)
Dept: LAB | Age: 60
End: 2025-05-01
Attending: STUDENT IN AN ORGANIZED HEALTH CARE EDUCATION/TRAINING PROGRAM
Payer: COMMERCIAL

## 2025-05-01 VITALS
SYSTOLIC BLOOD PRESSURE: 171 MMHG | BODY MASS INDEX: 46.41 KG/M2 | HEIGHT: 62 IN | WEIGHT: 252.19 LBS | DIASTOLIC BLOOD PRESSURE: 96 MMHG | RESPIRATION RATE: 18 BRPM | TEMPERATURE: 97 F | HEART RATE: 67 BPM | OXYGEN SATURATION: 98 %

## 2025-05-01 DIAGNOSIS — E11.9 TYPE 2 DIABETES MELLITUS WITHOUT COMPLICATION, WITHOUT LONG-TERM CURRENT USE OF INSULIN (HCC): Primary | ICD-10-CM

## 2025-05-01 DIAGNOSIS — Z12.31 VISIT FOR SCREENING MAMMOGRAM: ICD-10-CM

## 2025-05-01 DIAGNOSIS — E11.9 TYPE 2 DIABETES MELLITUS WITHOUT COMPLICATION, WITHOUT LONG-TERM CURRENT USE OF INSULIN (HCC): ICD-10-CM

## 2025-05-01 DIAGNOSIS — M1A.9XX0 CHRONIC GOUT WITHOUT TOPHUS, UNSPECIFIED CAUSE, UNSPECIFIED SITE: ICD-10-CM

## 2025-05-01 DIAGNOSIS — I10 PRIMARY HYPERTENSION: ICD-10-CM

## 2025-05-01 PROBLEM — R07.9 CHEST PAIN: Status: ACTIVE | Noted: 2023-06-28

## 2025-05-01 PROBLEM — E13.9 DIABETES 1.5, MANAGED AS TYPE 2 (HCC): Status: RESOLVED | Noted: 2018-03-20 | Resolved: 2025-05-01

## 2025-05-01 PROBLEM — M77.11 LATERAL EPICONDYLITIS OF RIGHT ELBOW: Status: ACTIVE | Noted: 2017-02-21

## 2025-05-01 LAB
ALBUMIN SERPL-MCNC: 4.5 G/DL (ref 3.2–4.8)
ALBUMIN/GLOB SERPL: 1.6 {RATIO} (ref 1–2)
ALP LIVER SERPL-CCNC: 72 U/L (ref 46–118)
ALT SERPL-CCNC: 25 U/L (ref 10–49)
ANION GAP SERPL CALC-SCNC: 10 MMOL/L (ref 0–18)
AST SERPL-CCNC: 22 U/L (ref ?–34)
BASOPHILS # BLD AUTO: 0.03 X10(3) UL (ref 0–0.2)
BASOPHILS NFR BLD AUTO: 0.5 %
BILIRUB SERPL-MCNC: 0.4 MG/DL (ref 0.3–1.2)
BUN BLD-MCNC: 9 MG/DL (ref 9–23)
BUN/CREAT SERPL: 9.2 (ref 10–20)
CALCIUM BLD-MCNC: 9.1 MG/DL (ref 8.7–10.4)
CHLORIDE SERPL-SCNC: 108 MMOL/L (ref 98–112)
CHOLEST SERPL-MCNC: 216 MG/DL (ref ?–200)
CO2 SERPL-SCNC: 27 MMOL/L (ref 21–32)
CREAT BLD-MCNC: 0.98 MG/DL (ref 0.55–1.02)
DEPRECATED RDW RBC AUTO: 45.1 FL (ref 35.1–46.3)
EGFRCR SERPLBLD CKD-EPI 2021: 67 ML/MIN/1.73M2 (ref 60–?)
EOSINOPHIL # BLD AUTO: 0.12 X10(3) UL (ref 0–0.7)
EOSINOPHIL NFR BLD AUTO: 2 %
ERYTHROCYTE [DISTWIDTH] IN BLOOD BY AUTOMATED COUNT: 14.7 % (ref 11–15)
EST. AVERAGE GLUCOSE BLD GHB EST-MCNC: 148 MG/DL (ref 68–126)
FASTING PATIENT LIPID ANSWER: YES
FASTING STATUS PATIENT QL REPORTED: YES
GLOBULIN PLAS-MCNC: 2.9 G/DL (ref 2–3.5)
GLUCOSE BLD-MCNC: 92 MG/DL (ref 70–99)
HBA1C MFR BLD: 6.8 % (ref ?–5.7)
HCT VFR BLD AUTO: 42.9 % (ref 35–48)
HDLC SERPL-MCNC: 45 MG/DL (ref 40–59)
HGB BLD-MCNC: 13.2 G/DL (ref 12–16)
IMM GRANULOCYTES # BLD AUTO: 0.01 X10(3) UL (ref 0–1)
IMM GRANULOCYTES NFR BLD: 0.2 %
LDLC SERPL CALC-MCNC: 152 MG/DL (ref ?–100)
LYMPHOCYTES # BLD AUTO: 1.99 X10(3) UL (ref 1–4)
LYMPHOCYTES NFR BLD AUTO: 33.3 %
MCH RBC QN AUTO: 25.8 PG (ref 26–34)
MCHC RBC AUTO-ENTMCNC: 30.8 G/DL (ref 31–37)
MCV RBC AUTO: 84 FL (ref 80–100)
MONOCYTES # BLD AUTO: 0.54 X10(3) UL (ref 0.1–1)
MONOCYTES NFR BLD AUTO: 9 %
NEUTROPHILS # BLD AUTO: 3.28 X10 (3) UL (ref 1.5–7.7)
NEUTROPHILS # BLD AUTO: 3.28 X10(3) UL (ref 1.5–7.7)
NEUTROPHILS NFR BLD AUTO: 55 %
NONHDLC SERPL-MCNC: 171 MG/DL (ref ?–130)
OSMOLALITY SERPL CALC.SUM OF ELEC: 298 MOSM/KG (ref 275–295)
PLATELET # BLD AUTO: 235 10(3)UL (ref 150–450)
POTASSIUM SERPL-SCNC: 3.7 MMOL/L (ref 3.5–5.1)
PROT SERPL-MCNC: 7.4 G/DL (ref 5.7–8.2)
RBC # BLD AUTO: 5.11 X10(6)UL (ref 3.8–5.3)
SODIUM SERPL-SCNC: 145 MMOL/L (ref 136–145)
TRIGL SERPL-MCNC: 106 MG/DL (ref 30–149)
TSI SER-ACNC: 1.54 UIU/ML (ref 0.55–4.78)
URATE SERPL-MCNC: 10.4 MG/DL (ref 3.1–7.8)
VIT D+METAB SERPL-MCNC: 36.2 NG/ML (ref 30–100)
VLDLC SERPL CALC-MCNC: 20 MG/DL (ref 0–30)
WBC # BLD AUTO: 6 X10(3) UL (ref 4–11)

## 2025-05-01 PROCEDURE — 80061 LIPID PANEL: CPT

## 2025-05-01 PROCEDURE — 99214 OFFICE O/P EST MOD 30 MIN: CPT | Performed by: STUDENT IN AN ORGANIZED HEALTH CARE EDUCATION/TRAINING PROGRAM

## 2025-05-01 PROCEDURE — 84443 ASSAY THYROID STIM HORMONE: CPT

## 2025-05-01 PROCEDURE — 83036 HEMOGLOBIN GLYCOSYLATED A1C: CPT | Performed by: STUDENT IN AN ORGANIZED HEALTH CARE EDUCATION/TRAINING PROGRAM

## 2025-05-01 PROCEDURE — 3077F SYST BP >= 140 MM HG: CPT | Performed by: STUDENT IN AN ORGANIZED HEALTH CARE EDUCATION/TRAINING PROGRAM

## 2025-05-01 PROCEDURE — 3080F DIAST BP >= 90 MM HG: CPT | Performed by: STUDENT IN AN ORGANIZED HEALTH CARE EDUCATION/TRAINING PROGRAM

## 2025-05-01 PROCEDURE — 80053 COMPREHEN METABOLIC PANEL: CPT

## 2025-05-01 PROCEDURE — 82306 VITAMIN D 25 HYDROXY: CPT

## 2025-05-01 PROCEDURE — 85025 COMPLETE CBC W/AUTO DIFF WBC: CPT

## 2025-05-01 PROCEDURE — 3008F BODY MASS INDEX DOCD: CPT | Performed by: STUDENT IN AN ORGANIZED HEALTH CARE EDUCATION/TRAINING PROGRAM

## 2025-05-01 PROCEDURE — 36415 COLL VENOUS BLD VENIPUNCTURE: CPT

## 2025-05-01 PROCEDURE — 84550 ASSAY OF BLOOD/URIC ACID: CPT

## 2025-05-01 RX ORDER — HYDRALAZINE HYDROCHLORIDE 25 MG/1
TABLET, FILM COATED ORAL
Qty: 30 TABLET | Refills: 0 | Status: SHIPPED | OUTPATIENT
Start: 2025-05-01

## 2025-05-01 RX ORDER — HYDROCHLOROTHIAZIDE 12.5 MG/1
CAPSULE ORAL
COMMUNITY
Start: 2025-01-16

## 2025-05-01 RX ORDER — ERGOCALCIFEROL 1.25 MG/1
50000 CAPSULE, LIQUID FILLED ORAL WEEKLY
COMMUNITY
Start: 2025-04-19 | End: 2025-05-01 | Stop reason: ALTCHOICE

## 2025-05-01 RX ORDER — OXYCODONE AND ACETAMINOPHEN 5; 325 MG/1; MG/1
1 TABLET ORAL EVERY 6 HOURS PRN
COMMUNITY

## 2025-05-01 NOTE — PROGRESS NOTES
The following individual(s) verbally consented to be recorded using ambient AI listening technology and understand that they can each withdraw their consent to this listening technology at any point by asking the clinician to turn off or pause the recording:YES    Patient name: Db Jed  Additional names:

## 2025-05-01 NOTE — PROGRESS NOTES
Subjective     Chief Complaint   Patient presents with    [facility] Care     New pt. Would like mammogram/colonoscopy screening/ PAP ordered       History of Present Illness  Db Adkins is a 60 year old female with hypertension, type 2 diabetes, and gout who presents for re-establishment of care and medication management.    She has a history of hypertension and is currently taking labetalol 300 mg twice daily. Hydralazine 25 mg is prescribed as needed for stress-induced blood pressure spikes. Her usual blood pressure is around 138/85 mmHg, but she has not checked it in the last three days since traveling from [place]. She left her hydralazine at home and requests a temporary supply while in town.    She has type 2 diabetes and has not been taking her diabetes medications, including glimepiride and Ozempic, for about six months. She has been monitoring her condition with herbal teas and reports doing well. She acknowledges the need for updated blood work to assess her current status.    She has a history of gout and is prescribed allopurinol, which she has not been taking regularly. She manages her condition by avoiding known triggers and increasing water intake. She uses hydrocodone and oxycodone for pain management during gout attacks, as advised to prefer these over indomethacin. She also uses methylprednisolone (Dosepak) during flare-ups.    She reports a past adverse reaction to ACE inhibitors, specifically angioedema, and is therefore allergic to this class of medication.    She completed a three-month course of vitamin D 50,000 IU weekly and requests blood work to confirm her current levels.    Socially, she splits her time between [place] and [place], [place].    Results        Past Medical History[1]    Past Surgical History[2]    Allergies[3]    Family History[4]    Social Hx on file[5]      I have personally reviewed and updated the following EMR sections as appropriate: Current medications,  Allergies, Problem list, Past Medical History, Past Surgical History, Social History and Family History    Review of Systems   Constitutional: Negative.    Respiratory: Negative.     Cardiovascular: Negative.    Gastrointestinal: Negative.    Skin: Negative.    Neurological: Negative.        Objective     Medications Ordered Prior to Encounter[6]  BP (!) 171/96 (BP Location: Right arm, Patient Position: Sitting, Cuff Size: adult)   Pulse 67   Temp 97 °F (36.1 °C) (Temporal)   Resp 18   Ht 5' 2\" (1.575 m)   Wt 252 lb 3.2 oz (114.4 kg)   SpO2 98%   BMI 46.13 kg/m²   Physical Exam  Vitals reviewed.   Constitutional:       Appearance: Normal appearance.   HENT:      Head: Normocephalic and atraumatic.   Cardiovascular:      Rate and Rhythm: Normal rate and regular rhythm.   Pulmonary:      Effort: Pulmonary effort is normal.      Breath sounds: Normal breath sounds.   Skin:     General: Skin is warm and dry.   Neurological:      General: No focal deficit present.      Mental Status: She is alert and oriented to person, place, and time.   Psychiatric:         Mood and Affect: Mood normal.         Behavior: Behavior normal.         No results found for this or any previous visit (from the past 14 weeks).    Assessment and Plan      Type 2 diabetes mellitus without complication, without long-term current use of insulin (HCC) (Primary)  -     CBC With Differential With Platelet; Future; Expected date: 05/01/2025  -     Comp Metabolic Panel (14); Future; Expected date: 05/01/2025  -     Hemoglobin A1C  -     Lipid Panel; Future; Expected date: 05/01/2025  -     TSH W Reflex To Free T4; Future; Expected date: 05/01/2025  -     Vitamin D; Future; Expected date: 05/01/2025  -     Uric Acid; Future; Expected date: 05/01/2025  Visit for screening mammogram  -     [facility] TOMMIE 2D+3D SCREENING BILAT (CPT=77067/01163); Future; Expected date: 05/01/2025  Primary hypertension  Chronic gout without tophus, unspecified cause,  unspecified site  Other orders  -     hydrALAZINE HCl; TAKE 1 TABLET BY MOUTH TWICE DAILY FOR BLOOD PRESSURE  Dispense: 30 tablet; Refill: 0      Assessment & Plan  Hypertension  Blood pressure elevated in office, normal at home. On labetalol 300 mg twice daily, hydralazine as needed.  - Instructed her to monitor blood pressure at home three to four times a week and keep a log.  - Sent hydralazine prescription to [facility].  - Review blood pressure log at next appointment to assess control.    Type 2 Diabetes Mellitus  Off diabetes medications for six months, managing with herbal teas. Need to assess glycemic control.  - Order hemoglobin A1c.  - Order comprehensive metabolic panel including blood count, kidney function, liver function, electrolytes.    Gout  Not taking allopurinol, managing with dietary modifications and hydration. Need to assess uric acid levels.  - Order uric acid level.    Wellness Visit  Initial visit to establish care. Preventative care discussion planned for next visit.  - Order mammogram.  - Schedule follow-up visit in four weeks for annual exam and review of lab results.       Return in about 4 weeks (around 5/29/2025) for annual.    Frederick Cummings MD  Internal Medicine  5/1/2025       [1]   Past Medical History:   Gout    Torn rotator cuff    Right shoulder    Unspecified essential hypertension   [2]   Past Surgical History:  Procedure Laterality Date    Tubal ligation     [3]   Allergies  Allergen Reactions    Ace Inhibitors SWELLING     Swelling face and lips      [4]   Family History  Problem Relation Age of Onset    Cancer Father     Diabetes Sister     Dementia Mother 67        Alzheimers    Breast Cancer Mother 73    Diabetes Brother     Other (Bell's Palsy) Brother     Breast Cancer Maternal Grandmother         pre   [5]   Social History  Socioeconomic History    Marital status: Single   Tobacco Use    Smoking status: Never    Smokeless tobacco: Never   Vaping Use    Vaping  status: Never Used   Substance and Sexual Activity    Alcohol use: No    Drug use: No   Other Topics Concern    Caffeine Concern Yes     Comment: Soda 8 oz daily   [6]   Current Outpatient Medications on File Prior to Visit   Medication Sig Dispense Refill    oxyCODONE-acetaminophen 5-325 MG Oral Tab Take 1 tablet by mouth every 6 (six) hours as needed.      Continuous Glucose Sensor (FREESTYLE ELLIOT 3 PLUS SENSOR) Does not apply Misc CHANGE SENSOR EVERY 15 DAYS      Glucose Blood (ONETOUCH VERIO) In Vitro Strip USE TO TEST TWICE DAILY 200 strip 0    OneTouch Delica Lancets 30G Does not apply Misc 1 each in the morning and 1 each before bedtime. 200 each 0    methylPREDNISolone (MEDROL) 4 MG Oral Tablet Therapy Pack As directed. 21 each 1    HYDROcodone-acetaminophen (NORCO) 5-325 MG Oral Tab Take 1 tablet by mouth every 6 (six) hours as needed for Pain. 30 tablet 0    indomethacin 50 MG Oral Cap Take 1 capsule (50 mg total) by mouth 3 (three) times daily as needed. 30 capsule 0    LABETALOL 100 MG Oral Tab TAKE 1 TABLET BY MOUTH THREE TIMES DAILY FOR BLOOD PRESSURE (Patient taking differently: Take 3 tablets (300 mg total) by mouth 2 (two) times daily. TAKE 1 TABLET BY MOUTH TWICE  DAILY FOR BLOOD PRESSURE.) 270 tablet 3    ALLOPURINOL 300 MG Oral Tab TAKE 1 TABLET(300 MG) BY MOUTH DAILY 90 tablet 3    Blood Glucose Monitoring Suppl (ONETOUCH VERIO IQ SYSTEM) w/Device Does not apply Kit Test blood sugar twice daily 1 kit 1    glimepiride 1 MG Oral Tab TAKE 1/2 TABLET BY MOUTH DAILY FOR DIABETES (Patient not taking: Reported on 5/1/2025) 45 tablet 1    OZEMPIC, 1 MG/DOSE, 4 MG/3ML Subcutaneous Solution Pen-injector Inject 0.5 mg into the skin once a week. (Patient not taking: Reported on 5/1/2025)      ATORVASTATIN 10 MG Oral Tab TAKE 1 TABLET BY MOUTH DAILY FOR CHOLESTEROL (Patient not taking: Reported on 5/1/2025) 90 tablet 0     No current facility-administered medications on file prior to visit.

## (undated) DIAGNOSIS — Z12.31 ENCOUNTER FOR SCREENING MAMMOGRAM FOR MALIGNANT NEOPLASM OF BREAST: Primary | ICD-10-CM

## (undated) DIAGNOSIS — E13.9 DIABETES 1.5, MANAGED AS TYPE 2 (HCC): ICD-10-CM

## (undated) DIAGNOSIS — I10 ESSENTIAL HYPERTENSION: ICD-10-CM

## (undated) DIAGNOSIS — E79.0 ELEVATED BLOOD URIC ACID LEVEL: ICD-10-CM

## (undated) DIAGNOSIS — E78.00 HYPERCHOLESTEREMIA: ICD-10-CM

## (undated) NOTE — ED AVS SNAPSHOT
Carlos Arenas   MRN: [id number]    Department:  [facility] Emergency Department   Date of Visit:  3/15/2018           Disclosure     Insurance plans vary and the physician(s) referred by the ER may not be covered by your plan.  Please contact yo CARE PHYSICIAN AT ONCE OR RETURN IMMEDIATELY TO THE EMERGENCY DEPARTMENT. If you have been prescribed any medication(s), please fill your prescription right away and begin taking the medication(s) as directed.   If you believe that any of the medications

## (undated) NOTE — MR AVS SNAPSHOT
[address] [address]  [phone]               Thank you for choosing us for your health care visit with Lisy Griffith MD.  We are glad to serve you and happy to provide you with this sum Commonly known as:  NORCO           indomethacin 50 MG Caps   Take 1 capsule (50 mg total) by mouth every 6 (six) hours as needed (gout). Commonly known as:  INDOCIN           Verapamil HCl  MG Cp24   Take 1 capsule (180 mg total) by mouth daily.

## (undated) NOTE — LETTER
Date & Time: 2/21/2019, 1:29 PM  Patient: Itzel Gee  Encounter Provider(s):    Dorita Snell MD       To Whom It May Concern:    Itzel Gee was seen and treated in our department on 2/19/2019. She should not return to work until 2/25/19.

## (undated) NOTE — MR AVS SNAPSHOT
[place]  [facility] [address] [place] [address]  [phone]               Thank you for choosing us for your health care visit with Dorothy Muller MD.  We are glad to serve you and happy to provide you with this summary of your visit.   Yolande Commonly known as:  BACTRIM,SEPTRA           Verapamil HCl  MG Cp24   Take 1 capsule (180 mg total) by mouth daily.    Commonly known as:  CHANCEELAN PM                Where to Get Your Medications      These medications were sent to [place] [address]

## (undated) NOTE — LETTER
8/15/2022          To Whom It May Concern:    Lilian Crocker is currently under my medical care and may not return to work at this time. Please excuse Pam Muniz from work from 08/10/2022-08/19/2022. She may return to work on 08/22/2022. Activity is restricted as follows: none. If you require additional information please contact our office. Sincerely,    Jodi Amador.  MD Renzo

## (undated) NOTE — MR AVS SNAPSHOT
[place]  [facility] [address] [place] [address]  [phone]               Thank you for choosing us for your health care visit with Nara Vance. MD Renzo.  We are glad to serve you and happy to provide you with this summary of your visit.   Yolande site [M1A.9XX0], Essential hypertension [I10]           Comp Metabolic Panel (14) [E]    Complete by:  Feb 17, 2017 (Approximate)    Assoc Dx:  Chronic gout without tophus, unspecified cause, unspecified site [M1A.9XX0], Essential hypertension [I10] Instructions:   To schedule a test at any [facility], call Central Scheduling at [phone], Monday through Friday between 7:30am to 6pm and on Saturday between 8am and 1pm. Evening and weekend appointments for your exam are a Avoid over sized portions. Don’t eat while when you’re bored.      EAT THESE FOODS MORE OFTEN: EAT THESE FOODS LESS OFTEN:   Make half your plate fruits and vegetables Highly refined, white starches including white bread, rice and pasta   Eat plenty of pr

## (undated) NOTE — ED AVS SNAPSHOT
[facility] Emergency Department    [address]eri [address]     [address] [place] [address]    Phone:  [phone]    Fax:  [phone]           Dawn Chavez   MRN: [id number]    Department:  [facility] Emergency Department   Date of Visit:  6/15/2 and Class Registration line at [phone] or find a doctor online by visiting [email or web address].    IF THERE IS ANY CHANGE OR WORSENING OF YOUR CONDITION, CALL YOUR PRIMARY CARE PHYSICIAN AT ONCE OR RETURN IMMEDIATELY TO [address] [place] [address].     If

## (undated) NOTE — ED AVS SNAPSHOT
[facility] Emergency Department    [address]eri [address]     [address] [place] [address]    Phone:  [phone]    Fax:  [phone]           Levi Leandro   MRN: [id number]    Department:  [facility] Emergency Department   Date of Visit:  6/15/2 You can get these medications from any pharmacy     Bring a paper prescription for each of these medications    - sulfamethoxazole-trimethoprim 200-40 MG/5ML Susp            Discharge References/Attachments     URINARY TRACT INFECTIONS (Darling Grimaldo), Richie Thomas and Class Registration line at [phone] or find a doctor online by visiting [email or web address].    IF THERE IS ANY CHANGE OR WORSENING OF YOUR CONDITION, CALL YOUR PRIMARY CARE PHYSICIAN AT ONCE OR RETURN IMMEDIATELY TO [address] [place] [address].     If you to explore options for quitting.     - If you have concerns related to behavioral health issues or thoughts of harming yourself, contact [facility] at [phone].     - If you don’t have insurance, Jareth Lovelace

## (undated) NOTE — LETTER
Date & Time: 2/19/2019, 10:47 AM  Patient: Stormy Yeung  Encounter Provider(s):    Bishop Hardeep MD       To Whom It May Concern:    Stormy Yeung was seen and treated in our department on 2/19/2019.  She can return to work tomorrow on 2/20/19 i

## (undated) NOTE — Clinical Note
3/13/2017              Db Flores        [address]         To Whom It May Concern,    I saw Lizeth Rothman in my clinic this morning. She is excused from work. Thank you.     Sincerely,    MD JOHN Siegel